# Patient Record
Sex: MALE | Race: WHITE | NOT HISPANIC OR LATINO | Employment: OTHER | URBAN - METROPOLITAN AREA
[De-identification: names, ages, dates, MRNs, and addresses within clinical notes are randomized per-mention and may not be internally consistent; named-entity substitution may affect disease eponyms.]

---

## 2021-09-03 ENCOUNTER — HOSPITAL ENCOUNTER (INPATIENT)
Facility: HOSPITAL | Age: 76
LOS: 7 days | Discharge: HOME | DRG: 872 | End: 2021-09-10
Attending: EMERGENCY MEDICINE | Admitting: INTERNAL MEDICINE
Payer: MEDICARE

## 2021-09-03 ENCOUNTER — APPOINTMENT (EMERGENCY)
Dept: RADIOLOGY | Facility: HOSPITAL | Age: 76
DRG: 872 | End: 2021-09-03
Payer: MEDICARE

## 2021-09-03 ENCOUNTER — APPOINTMENT (EMERGENCY)
Dept: CT IMAGING | Facility: HOSPITAL | Age: 76
DRG: 872 | End: 2021-09-03
Payer: MEDICARE

## 2021-09-03 DIAGNOSIS — R78.81 BACTEREMIA: ICD-10-CM

## 2021-09-03 DIAGNOSIS — R78.81 GRAM-NEGATIVE BACTEREMIA: ICD-10-CM

## 2021-09-03 DIAGNOSIS — R79.89 ELEVATED LFTS: ICD-10-CM

## 2021-09-03 DIAGNOSIS — R50.9 FEVER: Primary | ICD-10-CM

## 2021-09-03 DIAGNOSIS — Z01.818 PREOPERATIVE CLEARANCE: ICD-10-CM

## 2021-09-03 DIAGNOSIS — K80.70 CHOLELITHIASIS WITH CHOLEDOCHOLITHIASIS: ICD-10-CM

## 2021-09-03 PROBLEM — N18.30 CKD (CHRONIC KIDNEY DISEASE) STAGE 3, GFR 30-59 ML/MIN (HCC): Status: ACTIVE | Noted: 2021-09-03

## 2021-09-03 PROBLEM — D69.1 THROMBOCYTOPATHIA (HCC): Status: ACTIVE | Noted: 2021-09-03

## 2021-09-03 PROBLEM — E11.69 HYPERLIPIDEMIA ASSOCIATED WITH TYPE 2 DIABETES MELLITUS (HCC): Status: ACTIVE | Noted: 2021-09-03

## 2021-09-03 PROBLEM — A41.9 SEPSIS (HCC): Status: ACTIVE | Noted: 2021-09-03

## 2021-09-03 PROBLEM — N20.0 HORSESHOE KIDNEY WITH RENAL CALCULUS: Status: ACTIVE | Noted: 2018-10-09

## 2021-09-03 PROBLEM — E83.42 HYPOMAGNESEMIA: Status: ACTIVE | Noted: 2021-08-01

## 2021-09-03 PROBLEM — E78.5 HYPERLIPIDEMIA ASSOCIATED WITH TYPE 2 DIABETES MELLITUS (HCC): Status: ACTIVE | Noted: 2021-09-03

## 2021-09-03 PROBLEM — R77.8 ELEVATED TROPONIN: Status: ACTIVE | Noted: 2021-09-03

## 2021-09-03 PROBLEM — E11.9 DM (DIABETES MELLITUS), TYPE 2 (HCC): Status: ACTIVE | Noted: 2021-09-03

## 2021-09-03 PROBLEM — R74.01 TRANSAMINITIS: Status: ACTIVE | Noted: 2021-09-03

## 2021-09-03 PROBLEM — E03.9 HYPOTHYROIDISM: Status: ACTIVE | Noted: 2021-09-03

## 2021-09-03 PROBLEM — N40.0 BENIGN PROSTATIC HYPERPLASIA WITHOUT LOWER URINARY TRACT SYMPTOMS: Status: ACTIVE | Noted: 2021-09-03

## 2021-09-03 PROBLEM — Q63.1 HORSESHOE KIDNEY WITH RENAL CALCULUS: Status: ACTIVE | Noted: 2018-10-09

## 2021-09-03 LAB
ALBUMIN SERPL BCP-MCNC: 3.3 G/DL (ref 3.5–5)
ALP SERPL-CCNC: 99 U/L (ref 46–116)
ALT SERPL W P-5'-P-CCNC: 485 U/L (ref 12–78)
ANION GAP SERPL CALCULATED.3IONS-SCNC: 11 MMOL/L (ref 4–13)
APAP SERPL-MCNC: <2 UG/ML (ref 10–20)
AST SERPL W P-5'-P-CCNC: 570 U/L (ref 5–45)
ATRIAL RATE: 83 BPM
BASOPHILS # BLD AUTO: 0.04 THOUSANDS/ΜL (ref 0–0.1)
BASOPHILS NFR BLD AUTO: 1 % (ref 0–1)
BILIRUB SERPL-MCNC: 0.97 MG/DL (ref 0.2–1)
BUN SERPL-MCNC: 19 MG/DL (ref 5–25)
CALCIUM ALBUM COR SERPL-MCNC: 9.2 MG/DL (ref 8.3–10.1)
CALCIUM SERPL-MCNC: 8.6 MG/DL (ref 8.3–10.1)
CHLORIDE SERPL-SCNC: 103 MMOL/L (ref 100–108)
CO2 SERPL-SCNC: 24 MMOL/L (ref 21–32)
CREAT SERPL-MCNC: 1.22 MG/DL (ref 0.6–1.3)
EOSINOPHIL # BLD AUTO: 0.13 THOUSAND/ΜL (ref 0–0.61)
EOSINOPHIL NFR BLD AUTO: 2 % (ref 0–6)
ERYTHROCYTE [DISTWIDTH] IN BLOOD BY AUTOMATED COUNT: 13.9 % (ref 11.6–15.1)
FLUAV RNA RESP QL NAA+PROBE: NEGATIVE
FLUBV RNA RESP QL NAA+PROBE: NEGATIVE
GFR SERPL CREATININE-BSD FRML MDRD: 58 ML/MIN/1.73SQ M
GLUCOSE SERPL-MCNC: 140 MG/DL (ref 65–140)
GLUCOSE SERPL-MCNC: 184 MG/DL (ref 65–140)
HBV CORE AB SER QL: NORMAL
HBV CORE IGM SER QL: NORMAL
HBV SURFACE AG SER QL: NORMAL
HCT VFR BLD AUTO: 41.6 % (ref 36.5–49.3)
HCV AB SER QL: NORMAL
HGB BLD-MCNC: 14.1 G/DL (ref 12–17)
IMM GRANULOCYTES # BLD AUTO: 0.04 THOUSAND/UL (ref 0–0.2)
IMM GRANULOCYTES NFR BLD AUTO: 1 % (ref 0–2)
INR PPP: 1.18 (ref 0.84–1.19)
LACTATE SERPL-SCNC: 1.9 MMOL/L (ref 0.5–2)
LACTATE SERPL-SCNC: 2.2 MMOL/L (ref 0.5–2)
LACTATE SERPL-SCNC: 2.3 MMOL/L (ref 0.5–2)
LYMPHOCYTES # BLD AUTO: 0.29 THOUSANDS/ΜL (ref 0.6–4.47)
LYMPHOCYTES NFR BLD AUTO: 5 % (ref 14–44)
MAGNESIUM SERPL-MCNC: 1 MG/DL (ref 1.6–2.6)
MCH RBC QN AUTO: 28.5 PG (ref 26.8–34.3)
MCHC RBC AUTO-ENTMCNC: 33.9 G/DL (ref 31.4–37.4)
MCV RBC AUTO: 84 FL (ref 82–98)
MONOCYTES # BLD AUTO: 0.35 THOUSAND/ΜL (ref 0.17–1.22)
MONOCYTES NFR BLD AUTO: 6 % (ref 4–12)
NEUTROPHILS # BLD AUTO: 5 THOUSANDS/ΜL (ref 1.85–7.62)
NEUTS SEG NFR BLD AUTO: 85 % (ref 43–75)
NRBC BLD AUTO-RTO: 0 /100 WBCS
P AXIS: 28 DEGREES
PLATELET # BLD AUTO: 88 THOUSANDS/UL (ref 149–390)
PMV BLD AUTO: 9.6 FL (ref 8.9–12.7)
POTASSIUM SERPL-SCNC: 4.5 MMOL/L (ref 3.5–5.3)
PR INTERVAL: 272 MS
PROCALCITONIN SERPL-MCNC: 1.39 NG/ML
PROT SERPL-MCNC: 6.3 G/DL (ref 6.4–8.2)
PROTHROMBIN TIME: 14.5 SECONDS (ref 11.6–14.5)
QRS AXIS: 1 DEGREES
QRSD INTERVAL: 92 MS
QT INTERVAL: 340 MS
QTC INTERVAL: 399 MS
RBC # BLD AUTO: 4.94 MILLION/UL (ref 3.88–5.62)
RSV RNA RESP QL NAA+PROBE: NEGATIVE
SARS-COV-2 RNA RESP QL NAA+PROBE: NEGATIVE
SODIUM SERPL-SCNC: 138 MMOL/L (ref 136–145)
T WAVE AXIS: 11 DEGREES
TROPONIN I SERPL-MCNC: 0.09 NG/ML
VENTRICULAR RATE: 83 BPM
WBC # BLD AUTO: 5.85 THOUSAND/UL (ref 4.31–10.16)

## 2021-09-03 PROCEDURE — 0241U HB NFCT DS VIR RESP RNA 4 TRGT: CPT | Performed by: EMERGENCY MEDICINE

## 2021-09-03 PROCEDURE — 84145 PROCALCITONIN (PCT): CPT | Performed by: INTERNAL MEDICINE

## 2021-09-03 PROCEDURE — 80143 DRUG ASSAY ACETAMINOPHEN: CPT | Performed by: INTERNAL MEDICINE

## 2021-09-03 PROCEDURE — 1124F ACP DISCUSS-NO DSCNMKR DOCD: CPT | Performed by: EMERGENCY MEDICINE

## 2021-09-03 PROCEDURE — 87077 CULTURE AEROBIC IDENTIFY: CPT | Performed by: EMERGENCY MEDICINE

## 2021-09-03 PROCEDURE — 93005 ELECTROCARDIOGRAM TRACING: CPT

## 2021-09-03 PROCEDURE — 36415 COLL VENOUS BLD VENIPUNCTURE: CPT | Performed by: EMERGENCY MEDICINE

## 2021-09-03 PROCEDURE — 87186 SC STD MICRODIL/AGAR DIL: CPT | Performed by: EMERGENCY MEDICINE

## 2021-09-03 PROCEDURE — 83605 ASSAY OF LACTIC ACID: CPT | Performed by: INTERNAL MEDICINE

## 2021-09-03 PROCEDURE — 96365 THER/PROPH/DIAG IV INF INIT: CPT

## 2021-09-03 PROCEDURE — 83605 ASSAY OF LACTIC ACID: CPT | Performed by: EMERGENCY MEDICINE

## 2021-09-03 PROCEDURE — 84484 ASSAY OF TROPONIN QUANT: CPT | Performed by: EMERGENCY MEDICINE

## 2021-09-03 PROCEDURE — 96368 THER/DIAG CONCURRENT INF: CPT

## 2021-09-03 PROCEDURE — 71045 X-RAY EXAM CHEST 1 VIEW: CPT

## 2021-09-03 PROCEDURE — 86705 HEP B CORE ANTIBODY IGM: CPT | Performed by: INTERNAL MEDICINE

## 2021-09-03 PROCEDURE — 85025 COMPLETE CBC W/AUTO DIFF WBC: CPT | Performed by: EMERGENCY MEDICINE

## 2021-09-03 PROCEDURE — 80053 COMPREHEN METABOLIC PANEL: CPT | Performed by: EMERGENCY MEDICINE

## 2021-09-03 PROCEDURE — 84484 ASSAY OF TROPONIN QUANT: CPT | Performed by: INTERNAL MEDICINE

## 2021-09-03 PROCEDURE — 99285 EMERGENCY DEPT VISIT HI MDM: CPT | Performed by: EMERGENCY MEDICINE

## 2021-09-03 PROCEDURE — G1004 CDSM NDSC: HCPCS

## 2021-09-03 PROCEDURE — 85610 PROTHROMBIN TIME: CPT | Performed by: INTERNAL MEDICINE

## 2021-09-03 PROCEDURE — 87040 BLOOD CULTURE FOR BACTERIA: CPT | Performed by: EMERGENCY MEDICINE

## 2021-09-03 PROCEDURE — 87340 HEPATITIS B SURFACE AG IA: CPT | Performed by: INTERNAL MEDICINE

## 2021-09-03 PROCEDURE — 82948 REAGENT STRIP/BLOOD GLUCOSE: CPT

## 2021-09-03 PROCEDURE — 74177 CT ABD & PELVIS W/CONTRAST: CPT

## 2021-09-03 PROCEDURE — 96361 HYDRATE IV INFUSION ADD-ON: CPT

## 2021-09-03 PROCEDURE — 99285 EMERGENCY DEPT VISIT HI MDM: CPT

## 2021-09-03 PROCEDURE — 83735 ASSAY OF MAGNESIUM: CPT | Performed by: EMERGENCY MEDICINE

## 2021-09-03 PROCEDURE — 99222 1ST HOSP IP/OBS MODERATE 55: CPT | Performed by: INTERNAL MEDICINE

## 2021-09-03 PROCEDURE — 93010 ELECTROCARDIOGRAM REPORT: CPT | Performed by: INTERNAL MEDICINE

## 2021-09-03 PROCEDURE — 86803 HEPATITIS C AB TEST: CPT | Performed by: INTERNAL MEDICINE

## 2021-09-03 PROCEDURE — 71260 CT THORAX DX C+: CPT

## 2021-09-03 PROCEDURE — 86704 HEP B CORE ANTIBODY TOTAL: CPT | Performed by: INTERNAL MEDICINE

## 2021-09-03 RX ORDER — LORATADINE 10 MG/1
10 TABLET ORAL DAILY
COMMUNITY

## 2021-09-03 RX ORDER — ACETAMINOPHEN 325 MG/1
650 TABLET ORAL EVERY 6 HOURS PRN
Status: DISCONTINUED | OUTPATIENT
Start: 2021-09-03 | End: 2021-09-10 | Stop reason: HOSPADM

## 2021-09-03 RX ORDER — LEVOTHYROXINE SODIUM 0.05 MG/1
50 TABLET ORAL DAILY
COMMUNITY

## 2021-09-03 RX ORDER — PROPRANOLOL HYDROCHLORIDE 20 MG/1
60 TABLET ORAL DAILY
Status: DISCONTINUED | OUTPATIENT
Start: 2021-09-03 | End: 2021-09-10 | Stop reason: HOSPADM

## 2021-09-03 RX ORDER — INSULIN ASPART 100 [IU]/ML
22 INJECTION, SUSPENSION SUBCUTANEOUS DAILY
COMMUNITY

## 2021-09-03 RX ORDER — LORATADINE 10 MG/1
10 TABLET ORAL DAILY
Status: DISCONTINUED | OUTPATIENT
Start: 2021-09-03 | End: 2021-09-10 | Stop reason: HOSPADM

## 2021-09-03 RX ORDER — SODIUM CHLORIDE, SODIUM GLUCONATE, SODIUM ACETATE, POTASSIUM CHLORIDE, MAGNESIUM CHLORIDE, SODIUM PHOSPHATE, DIBASIC, AND POTASSIUM PHOSPHATE .53; .5; .37; .037; .03; .012; .00082 G/100ML; G/100ML; G/100ML; G/100ML; G/100ML; G/100ML; G/100ML
100 INJECTION, SOLUTION INTRAVENOUS CONTINUOUS
Status: DISCONTINUED | OUTPATIENT
Start: 2021-09-03 | End: 2021-09-03

## 2021-09-03 RX ORDER — PROPRANOLOL HYDROCHLORIDE 60 MG/1
60 TABLET ORAL DAILY
COMMUNITY

## 2021-09-03 RX ORDER — SILDENAFIL 100 MG/1
100 TABLET, FILM COATED ORAL DAILY PRN
COMMUNITY

## 2021-09-03 RX ORDER — OXYCODONE HYDROCHLORIDE 5 MG/1
5 TABLET ORAL EVERY 4 HOURS PRN
Status: DISCONTINUED | OUTPATIENT
Start: 2021-09-03 | End: 2021-09-10 | Stop reason: HOSPADM

## 2021-09-03 RX ORDER — INSULIN GLARGINE 100 [IU]/ML
28 INJECTION, SOLUTION SUBCUTANEOUS
COMMUNITY

## 2021-09-03 RX ORDER — MAGNESIUM SULFATE HEPTAHYDRATE 40 MG/ML
2 INJECTION, SOLUTION INTRAVENOUS ONCE
Status: COMPLETED | OUTPATIENT
Start: 2021-09-03 | End: 2021-09-03

## 2021-09-03 RX ORDER — PRAMIPEXOLE DIHYDROCHLORIDE 0.5 MG/1
1.5 TABLET ORAL 2 TIMES DAILY
Status: DISCONTINUED | OUTPATIENT
Start: 2021-09-03 | End: 2021-09-10 | Stop reason: HOSPADM

## 2021-09-03 RX ORDER — GABAPENTIN 400 MG/1
800 CAPSULE ORAL 3 TIMES DAILY
Status: DISCONTINUED | OUTPATIENT
Start: 2021-09-03 | End: 2021-09-10 | Stop reason: HOSPADM

## 2021-09-03 RX ORDER — ALOGLIPTIN 25 MG/1
25 TABLET, FILM COATED ORAL
COMMUNITY

## 2021-09-03 RX ORDER — LEVOFLOXACIN 5 MG/ML
750 INJECTION, SOLUTION INTRAVENOUS ONCE
Status: COMPLETED | OUTPATIENT
Start: 2021-09-03 | End: 2021-09-03

## 2021-09-03 RX ORDER — MELATONIN
1000 2 TIMES DAILY
COMMUNITY

## 2021-09-03 RX ORDER — PRAMIPEXOLE DIHYDROCHLORIDE 1.5 MG/1
1.5 TABLET ORAL 2 TIMES DAILY
COMMUNITY

## 2021-09-03 RX ORDER — INSULIN ASPART 100 [IU]/ML
22 INJECTION, SUSPENSION SUBCUTANEOUS DAILY
Status: DISCONTINUED | OUTPATIENT
Start: 2021-09-04 | End: 2021-09-10 | Stop reason: HOSPADM

## 2021-09-03 RX ORDER — LEVOTHYROXINE SODIUM 0.05 MG/1
50 TABLET ORAL DAILY
Status: DISCONTINUED | OUTPATIENT
Start: 2021-09-03 | End: 2021-09-10 | Stop reason: HOSPADM

## 2021-09-03 RX ORDER — LANOLIN ALCOHOL/MO/W.PET/CERES
CREAM (GRAM) TOPICAL DAILY
COMMUNITY

## 2021-09-03 RX ORDER — MELOXICAM 7.5 MG/1
7.5 TABLET ORAL AS NEEDED
COMMUNITY

## 2021-09-03 RX ORDER — GABAPENTIN 800 MG/1
900 TABLET ORAL 3 TIMES DAILY
COMMUNITY

## 2021-09-03 RX ORDER — PRAVASTATIN SODIUM 40 MG
40 TABLET ORAL DAILY
COMMUNITY

## 2021-09-03 RX ORDER — INSULIN ASPART 100 [IU]/ML
22 INJECTION, SUSPENSION SUBCUTANEOUS DAILY
Status: DISCONTINUED | OUTPATIENT
Start: 2021-09-03 | End: 2021-09-03

## 2021-09-03 RX ORDER — HEPARIN SODIUM 5000 [USP'U]/ML
5000 INJECTION, SOLUTION INTRAVENOUS; SUBCUTANEOUS EVERY 8 HOURS SCHEDULED
Status: DISCONTINUED | OUTPATIENT
Start: 2021-09-03 | End: 2021-09-08

## 2021-09-03 RX ORDER — TAMSULOSIN HYDROCHLORIDE 0.4 MG/1
0.4 CAPSULE ORAL
Status: DISCONTINUED | OUTPATIENT
Start: 2021-09-03 | End: 2021-09-10 | Stop reason: HOSPADM

## 2021-09-03 RX ORDER — INSULIN GLARGINE 100 [IU]/ML
28 INJECTION, SOLUTION SUBCUTANEOUS
Status: DISCONTINUED | OUTPATIENT
Start: 2021-09-03 | End: 2021-09-10 | Stop reason: HOSPADM

## 2021-09-03 RX ORDER — ASPIRIN 81 MG/1
81 TABLET, CHEWABLE ORAL DAILY
COMMUNITY

## 2021-09-03 RX ORDER — PRAVASTATIN SODIUM 40 MG
40 TABLET ORAL DAILY
Status: CANCELLED | OUTPATIENT
Start: 2021-09-03

## 2021-09-03 RX ORDER — TAMSULOSIN HYDROCHLORIDE 0.4 MG/1
0.4 CAPSULE ORAL 2 TIMES DAILY
COMMUNITY

## 2021-09-03 RX ORDER — HYDROMORPHONE HCL/PF 1 MG/ML
0.5 SYRINGE (ML) INJECTION ONCE
Status: COMPLETED | OUTPATIENT
Start: 2021-09-03 | End: 2021-09-04

## 2021-09-03 RX ORDER — ASPIRIN 81 MG/1
81 TABLET, CHEWABLE ORAL DAILY
Status: DISCONTINUED | OUTPATIENT
Start: 2021-09-03 | End: 2021-09-10 | Stop reason: HOSPADM

## 2021-09-03 RX ORDER — TIZANIDINE HYDROCHLORIDE 4 MG/1
4 CAPSULE, GELATIN COATED ORAL AS NEEDED
COMMUNITY

## 2021-09-03 RX ORDER — IBUPROFEN 200 MG
200 TABLET ORAL ONCE AS NEEDED
Status: DISCONTINUED | OUTPATIENT
Start: 2021-09-03 | End: 2021-09-03

## 2021-09-03 RX ORDER — TRAMADOL HYDROCHLORIDE 50 MG/1
50 TABLET ORAL EVERY 6 HOURS PRN
COMMUNITY

## 2021-09-03 RX ORDER — MAGNESIUM SULFATE HEPTAHYDRATE 40 MG/ML
2 INJECTION, SOLUTION INTRAVENOUS ONCE
Status: COMPLETED | OUTPATIENT
Start: 2021-09-03 | End: 2021-09-04

## 2021-09-03 RX ADMIN — PROPRANOLOL HYDROCHLORIDE 60 MG: 20 TABLET ORAL at 15:36

## 2021-09-03 RX ADMIN — SODIUM CHLORIDE 1000 ML: 0.9 INJECTION, SOLUTION INTRAVENOUS at 15:33

## 2021-09-03 RX ADMIN — SODIUM CHLORIDE 1000 ML: 0.9 INJECTION, SOLUTION INTRAVENOUS at 18:01

## 2021-09-03 RX ADMIN — SODIUM CHLORIDE 1000 ML: 0.9 INJECTION, SOLUTION INTRAVENOUS at 11:50

## 2021-09-03 RX ADMIN — ACETAMINOPHEN 650 MG: 325 TABLET, FILM COATED ORAL at 21:26

## 2021-09-03 RX ADMIN — GABAPENTIN 800 MG: 400 CAPSULE ORAL at 15:36

## 2021-09-03 RX ADMIN — IOHEXOL 100 ML: 350 INJECTION, SOLUTION INTRAVENOUS at 13:05

## 2021-09-03 RX ADMIN — HEPARIN SODIUM 5000 UNITS: 5000 INJECTION INTRAVENOUS; SUBCUTANEOUS at 16:32

## 2021-09-03 RX ADMIN — METRONIDAZOLE 500 MG: 500 INJECTION, SOLUTION INTRAVENOUS at 22:57

## 2021-09-03 RX ADMIN — INSULIN LISPRO 12 UNITS: 100 INJECTION, SOLUTION INTRAVENOUS; SUBCUTANEOUS at 18:10

## 2021-09-03 RX ADMIN — VANCOMYCIN HYDROCHLORIDE 1500 MG: 1 INJECTION, POWDER, LYOPHILIZED, FOR SOLUTION INTRAVENOUS at 17:59

## 2021-09-03 RX ADMIN — MAGNESIUM SULFATE HEPTAHYDRATE 2 G: 40 INJECTION, SOLUTION INTRAVENOUS at 13:44

## 2021-09-03 RX ADMIN — LEVOFLOXACIN 750 MG: 5 INJECTION, SOLUTION INTRAVENOUS at 13:39

## 2021-09-03 RX ADMIN — INSULIN LISPRO 2 UNITS: 100 INJECTION, SOLUTION INTRAVENOUS; SUBCUTANEOUS at 18:09

## 2021-09-03 RX ADMIN — OXYCODONE HYDROCHLORIDE 5 MG: 5 TABLET ORAL at 16:32

## 2021-09-03 RX ADMIN — HEPARIN SODIUM 5000 UNITS: 5000 INJECTION INTRAVENOUS; SUBCUTANEOUS at 22:56

## 2021-09-03 RX ADMIN — TAMSULOSIN HYDROCHLORIDE 0.4 MG: 0.4 CAPSULE ORAL at 15:36

## 2021-09-03 RX ADMIN — ASPIRIN 81 MG CHEWABLE TABLET 81 MG: 81 TABLET CHEWABLE at 15:36

## 2021-09-03 RX ADMIN — OXYCODONE HYDROCHLORIDE 5 MG: 5 TABLET ORAL at 21:26

## 2021-09-03 RX ADMIN — INSULIN GLARGINE 28 UNITS: 100 INJECTION, SOLUTION SUBCUTANEOUS at 22:56

## 2021-09-03 RX ADMIN — LEVOTHYROXINE SODIUM 50 MCG: 50 TABLET ORAL at 15:36

## 2021-09-03 RX ADMIN — PRAMIPEXOLE DIHYDROCHLORIDE 1.5 MG: 0.5 TABLET ORAL at 17:55

## 2021-09-03 RX ADMIN — LORATADINE 10 MG: 10 TABLET ORAL at 15:36

## 2021-09-03 RX ADMIN — GABAPENTIN 800 MG: 400 CAPSULE ORAL at 21:26

## 2021-09-03 NOTE — ASSESSMENT & PLAN NOTE
· AST//485  · Etiology may be secondary to sepsis; will continue monitor  · Will check chronic hepatitis panel, INR, and liver Doppler with ultrasound  · Recheck liver enzymes tomorrow

## 2021-09-03 NOTE — ED PROVIDER NOTES
History  Chief Complaint   Patient presents with    Fever - 9 weeks to 74 years     Pt has been febrile since lastnight and cough  Was given tylenol at 9am         History provided by:  Patient  Fever - 9 weeks to 74 years  Max temp prior to arrival:  105  Temp source:  Oral  Severity:  Moderate  Onset quality:  Sudden  Duration:  1 day  Timing:  Constant  Progression:  Partially resolved  Chronicity:  New  Relieved by:  Acetaminophen  Worsened by:  Nothing  Ineffective treatments:  None tried  Associated symptoms: chills, cough, diarrhea, myalgias and nausea    Risk factors: recent sickness (Was hospitalized in early Aug for 1 week for a LLL PNA) and recent travel (Up visiting from [facility])    Risk factors: no hx of cancer, no immunosuppression and no sick contacts        Prior to Admission Medications   Prescriptions Last Dose Informant Patient Reported? Taking?    Alogliptin Benzoate 25 MG TABS   Yes Yes   Sig: Take 25 mg by mouth   TiZANidine (ZANAFLEX) 4 MG capsule   Yes Yes   Sig: Take 4 mg by mouth as needed for muscle spasms   aspirin 81 mg chewable tablet   Yes Yes   Sig: Chew 81 mg daily   cholecalciferol (VITAMIN D3) 1,000 units tablet   Yes Yes   Sig: Take 1,000 Units by mouth 2 (two) times a day   gabapentin (NEURONTIN) 800 mg tablet   Yes Yes   Sig: Take 900 mg by mouth 3 (three) times a day   insulin aspart (NovoLOG) 100 units/mL injection   Yes Yes   Sig: Inject 12 Units under the skin daily before dinner   insulin aspart protamine-insulin aspart (NovoLOG 70/30) 100 units/mL injection   Yes Yes   Sig: Inject 22 Units under the skin daily   insulin glargine (LANTUS) 100 units/mL subcutaneous injection   Yes Yes   Sig: Inject 28 Units under the skin daily at bedtime   levothyroxine 50 mcg tablet   Yes Yes   Sig: Take 50 mcg by mouth daily   loratadine (CLARITIN) 10 mg tablet   Yes Yes   Sig: Take 10 mg by mouth daily   meloxicam (MOBIC) 7 5 mg tablet   Yes Yes   Sig: Take 7 5 mg by mouth as needed metFORMIN (GLUCOPHAGE) 500 mg tablet   Yes Yes   Sig: Take 500 mg by mouth 2 (two) times a day with meals   pramipexole (MIRAPEX) 1 5 MG tablet   Yes Yes   Sig: Take 1 5 mg by mouth 2 (two) times a day   pravastatin (PRAVACHOL) 40 mg tablet   Yes Yes   Sig: Take 40 mg by mouth daily   propranolol (INDERAL) 60 mg tablet   Yes Yes   Sig: Take 60 mg by mouth daily   sildenafil (VIAGRA) 100 mg tablet   Yes Yes   Sig: Take 100 mg by mouth daily as needed for erectile dysfunction   tamsulosin (FLOMAX) 0 4 mg   Yes Yes   Sig: Take 0 4 mg by mouth 2 (two) times a day   traMADol (ULTRAM) 50 mg tablet   Yes Yes   Sig: Take 50 mg by mouth every 6 (six) hours as needed for moderate pain   vitamin B-12 (VITAMIN B-12) 1,000 mcg tablet   Yes Yes   Sig: Take by mouth daily      Facility-Administered Medications: None       Past Medical History:   Diagnosis Date    Diabetes mellitus ([facility] )     MI (myocardial infarction) ([facility] )        Past Surgical History:   Procedure Laterality Date    BACK SURGERY      CARDIAC SURGERY      JOINT REPLACEMENT         History reviewed  No pertinent family history  I have reviewed and agree with the history as documented  E-Cigarette/Vaping    E-Cigarette Use Never User      E-Cigarette/Vaping Substances     Social History     Tobacco Use    Smoking status: Never Smoker    Smokeless tobacco: Current User   Vaping Use    Vaping Use: Never used   Substance Use Topics    Alcohol use: Never    Drug use: Never       Review of Systems   Constitutional: Positive for chills and fever  Respiratory: Positive for cough  Gastrointestinal: Positive for diarrhea and nausea  Musculoskeletal: Positive for myalgias  All other systems reviewed and are negative  Physical Exam  Physical Exam  Vitals reviewed  Constitutional:       Appearance: He is ill-appearing  He is not toxic-appearing  HENT:      Head: Normocephalic and atraumatic        Mouth/Throat:      Mouth: Mucous membranes are dry    Eyes:      Extraocular Movements: Extraocular movements intact  Pupils: Pupils are equal, round, and reactive to light  Cardiovascular:      Rate and Rhythm: Normal rate  Pulmonary:      Effort: Pulmonary effort is normal  No respiratory distress  Abdominal:      Palpations: Abdomen is soft  Tenderness: There is abdominal tenderness  Musculoskeletal:         General: No swelling  Cervical back: Neck supple  Skin:     General: Skin is warm  Findings: No bruising  Neurological:      General: No focal deficit present  Mental Status: He is alert  Psychiatric:         Mood and Affect: Mood normal          Vital Signs  ED Triage Vitals [09/03/21 1146]   Temperature Pulse Respirations Blood Pressure SpO2   98 °F (36 7 °C) 84 18 108/57 98 %      Temp Source Heart Rate Source Patient Position - Orthostatic VS BP Location FiO2 (%)   Oral Monitor -- -- --      Pain Score       --           Vitals:    09/03/21 1146 09/03/21 1200 09/03/21 1315   BP: 108/57 124/58 150/73   Pulse: 84 79 84         Visual Acuity      ED Medications  Medications   magnesium sulfate 2 g/50 mL IVPB (premix) 2 g (2 g Intravenous New Bag 9/3/21 1344)   levofloxacin (LEVAQUIN) IVPB (premix in dextrose) 750 mg 150 mL (750 mg Intravenous New Bag 9/3/21 1339)   sodium chloride 0 9 % bolus 1,000 mL (has no administration in time range)   sodium chloride 0 9 % bolus 1,000 mL (0 mL Intravenous Stopped 9/3/21 1321)   iohexol (OMNIPAQUE) 350 MG/ML injection (SINGLE-DOSE) 100 mL (100 mL Intravenous Given 9/3/21 1305)       Diagnostic Studies  Results Reviewed     Procedure Component Value Units Date/Time    Blood culture #1 [[id number]] Collected: 09/03/21 1338    Lab Status: In process Specimen: Blood from Arm, Right Updated: 09/03/21 1343    Lactic acid [[id number]] Collected: 09/03/21 1338    Lab Status:  In process Specimen: Blood from Arm, Right Updated: 09/03/21 1343    Troponin I repeat in 3hrs [[id number]]     Lab Status: No result Specimen: Blood     Lactic acid [[id number]]  (Abnormal) Collected: 09/03/21 1149    Lab Status: Final result Specimen: Blood from Arm, Left Updated: 09/03/21 1246     LACTIC ACID 2 2 mmol/L     Narrative:      Result may be elevated if tourniquet was used during collection  COVID19, Influenza A/B, RSV PCR, [facility] [[id number]]  (Normal) Collected: 09/03/21 1149    Lab Status: Final result Specimen: Nares from Nasopharyngeal Swab Updated: 09/03/21 1239     SARS-CoV-2 Negative     INFLUENZA A PCR Negative     INFLUENZA B PCR Negative     RSV PCR Negative    Narrative: This test has been authorized by FDA under an EUA (Emergency Use Assay) for use by authorized laboratories  Clinical caution and judgement should be used with the interpretation of these results with consideration of the clinical impression and other laboratory testing  Testing reported as "Positive" or "Negative" has been proven to be accurate according to standard laboratory validation requirements  All testing is performed with control materials showing appropriate reactivity at standard intervals      Comprehensive metabolic panel [[id number]]  (Abnormal) Collected: 09/03/21 1149    Lab Status: Final result Specimen: Blood from Arm, Left Updated: 09/03/21 1230     Sodium 138 mmol/L      Potassium 4 5 mmol/L      Chloride 103 mmol/L      CO2 24 mmol/L      ANION GAP 11 mmol/L      BUN 19 mg/dL      Creatinine 1 22 mg/dL      Glucose 184 mg/dL      Calcium 8 6 mg/dL      Corrected Calcium 9 2 mg/dL       U/L       U/L      Alkaline Phosphatase 99 U/L      Total Protein 6 3 g/dL      Albumin 3 3 g/dL      Total Bilirubin 0 97 mg/dL      eGFR 58 ml/min/1 73sq m     Narrative:      [place] guidelines for Chronic Kidney Disease (CKD):     Stage 1 with normal or high GFR (GFR > 90 mL/min/1 73 square meters)    Stage 2 Mild CKD (GFR = 60-89 mL/min/1 73 square meters)    Stage 3A Moderate CKD (GFR = 45-59 mL/min/1 73 square meters)    Stage 3B Moderate CKD (GFR = 30-44 mL/min/1 73 square meters)    Stage 4 Severe CKD (GFR = 15-29 mL/min/1 73 square meters)    Stage 5 End Stage CKD (GFR <15 mL/min/1 73 square meters)  Note: GFR calculation is accurate only with a steady state creatinine    Magnesium [[id number]]  (Abnormal) Collected: 09/03/21 1149    Lab Status: Final result Specimen: Blood from Arm, Left Updated: 09/03/21 1230     Magnesium 1 0 mg/dL     CBC and differential [[id number]]  (Abnormal) Collected: 09/03/21 1149    Lab Status: Final result Specimen: Blood from Arm, Left Updated: 09/03/21 1230     WBC 5 85 Thousand/uL      RBC 4 94 Million/uL      Hemoglobin 14 1 g/dL      Hematocrit 41 6 %      MCV 84 fL      MCH 28 5 pg      MCHC 33 9 g/dL      RDW 13 9 %      MPV 9 6 fL      Platelets 88 Thousands/uL      nRBC 0 /100 WBCs      Neutrophils Relative 85 %      Immat GRANS % 1 %      Lymphocytes Relative 5 %      Monocytes Relative 6 %      Eosinophils Relative 2 %      Basophils Relative 1 %      Neutrophils Absolute 5 00 Thousands/µL      Immature Grans Absolute 0 04 Thousand/uL      Lymphocytes Absolute 0 29 Thousands/µL      Monocytes Absolute 0 35 Thousand/µL      Eosinophils Absolute 0 13 Thousand/µL      Basophils Absolute 0 04 Thousands/µL     Troponin I [[id number]]  (Abnormal) Collected: 09/03/21 1149    Lab Status: Final result Specimen: Blood from Arm, Left Updated: 09/03/21 1218     Troponin I 0 09 ng/mL     Blood culture #2 [[id number]] Collected: 09/03/21 1149    Lab Status: In process Specimen: Blood from Arm, Left Updated: 09/03/21 1155    UA w Reflex to Microscopic w Reflex to Culture [[id number]]     Lab Status: No result Specimen: Urine                  CT chest abdomen pelvis w contrast   Final Result by Mindy Boyd MD (09/03 5907)      1  Mild bronchial wall thickening, which may related to small airwas inflammation    Right basilar subsegmental atelectasis also present  2   No acute abnormality within the abdomen or pelvis  3   Several enhancing foci within the posterior right hepatic lobe, possibly representing hemangiomas  Further characterization with nonemergent liver MRI recommended  4   Horseshoe kidney  The study was marked in [facility] for immediate notification  Workstation performed: [id number]         XR chest 1 view portable   ED Interpretation by Ileana Flores MD (09/03 1202)   NAD                 Procedures  Procedures         ED Course                             SBIRT 22yo+      Most Recent Value   SBIRT (23 yo +)   In order to provide better care to our patients, we are screening all of our patients for alcohol and drug use  Would it be okay to ask you these screening questions? Yes Filed at: 09/03/2021 1220   Initial Alcohol Screen: US AUDIT-C    1  How often do you have a drink containing alcohol?  0 Filed at: 09/03/2021 1220   2  How many drinks containing alcohol do you have on a typical day you are drinking? 0 Filed at: 09/03/2021 1220   3a  Male UNDER 65: How often do you have five or more drinks on one occasion? 0 Filed at: 09/03/2021 1220   3b  FEMALE Any Age, or MALE 65+: How often do you have 4 or more drinks on one occassion? 0 Filed at: 09/03/2021 1220   Audit-C Score  0 Filed at: 09/03/2021 1220   AR: How many times in the past year have you    Used an illegal drug or used a prescription medication for non-medical reasons?   Never Filed at: 09/03/2021 1220                    MDM  Number of Diagnoses or Management Options  Fever: new and requires workup     Amount and/or Complexity of Data Reviewed  Clinical lab tests: ordered and reviewed  Tests in the radiology section of CPT®: ordered and reviewed  Independent visualization of images, tracings, or specimens: yes    Risk of Complications, Morbidity, and/or Mortality  Presenting problems: high    Patient Progress  Patient progress: stable      Disposition  Final diagnoses:   Fever     Time reflects when diagnosis was documented in both MDM as applicable and the Disposition within this note     Time User Action Codes Description Comment    9/3/2021  2:05 PM Guero VAN Add [R50 9] Fever       ED Disposition     ED Disposition Condition Date/Time Comment    Admit Stable Fri Sep 3, 2021  2:05 PM       Follow-up Information    None         Patient's Medications   Discharge Prescriptions    No medications on file     No discharge procedures on file      PDMP Review     None          ED Provider  Electronically Signed by           Erin Moulton MD  09/03/21 1369

## 2021-09-03 NOTE — ASSESSMENT & PLAN NOTE
Lab Results   Component Value Date    EGFR 58 09/03/2021    CREATININE 1 22 09/03/2021   · Patient with history of horseshoe kidney  · Creatinine at baseline currently; baseline appears to be 1-1 2  · Continue to monitor

## 2021-09-03 NOTE — ASSESSMENT & PLAN NOTE
· Patient states he woke up with fever and rigors this morning and did state he had an episode of diarrhea today    Patient denies eating anything out of the ordinary yesterday and denies eating any shellfish or undercooked meats  · Patient is vaccinated and no on else in the family is currently sick  · Sepsis with unknown source; patient with temp of 101 7°, respirations 22, platelets 88, and lactic acid of 2 3  · AST and ALT markedly elevated  · CT chest abdomen pelvis-mild bronchial wall thickening; no acute abnormality within abdomen or pelvis; several enhancing foci within posterior right hepatic lobe possibly representing hemangiomas; horseshoe kidney  · Chest x-ray-no acute cardiopulmonary disease  · Lactic acid 2 3  · WBC count normal    Plan  · Will start aztreonam given cefazolin and penicillin allergy  · Will start vancomycin  · Will replete with sepsis protocol fluids  · Trend lactic acid until less than 2  · Blood cultures x2 drawn  · Follow-up UA  · Procalcitonin ordered  · Will check liver Doppler

## 2021-09-03 NOTE — ASSESSMENT & PLAN NOTE
No results found for: HGBA1C    No results for input(s): POCGLU in the last 72 hours      Blood Sugar Average: Last 72 hrs:  ·  hold oral antihyperglycemics  · Correctional scale insulin  · Glargine 28 units at night  · lispro 12 units with dinner  · NovoLog 70/3022 units in the morning

## 2021-09-03 NOTE — H&P
41 Annamarie  1945, 78 y o  male MRN: [id number]  Unit/Bed#: -01 Encounter: [id number]  Primary Care Provider: Apple Morgan   Date and time admitted to hospital: 9/3/2021 11:36 AM    * Sepsis [facility])  Assessment & Plan  · Patient states he woke up with fever and rigors this morning and did state he had an episode of diarrhea today    Patient denies eating anything out of the ordinary yesterday and denies eating any shellfish or undercooked meats  · Patient is vaccinated and no on else in the family is currently sick  · Sepsis with unknown source; patient with temp of 101 7°, respirations 22, platelets 88, and lactic acid of 2 3  · AST and ALT markedly elevated  · CT chest abdomen pelvis-mild bronchial wall thickening; no acute abnormality within abdomen or pelvis; several enhancing foci within posterior right hepatic lobe possibly representing hemangiomas; horseshoe kidney  · Chest x-ray-no acute cardiopulmonary disease  · Lactic acid 2 3  · WBC count normal    Plan  · Will start aztreonam given cefazolin and penicillin allergy  · Will start vancomycin  · Will replete with sepsis protocol fluids  · Trend lactic acid until less than 2  · Blood cultures x2 drawn  · Follow-up UA  · Procalcitonin ordered  · If patient still spiking times tomorrow may be pertinent to have orthopedics evaluate patient's knees as he did have right knee fusion in 1975 and has had septic knee in the past   Although patient denies any recent injury to either knee and no change in range of motion or pain with motion  · Will check liver Doppler    Transaminitis  Assessment & Plan  · AST//485  · patient was treated for pneumonia with levofloxacin recently  · Etiology may be secondary to sepsis; will continue monitor  · Will check chronic hepatitis panel, and liver Doppler with ultrasound  · Recheck liver enzymes tomorrow    Elevated troponin  Assessment & Plan  · Troponin 0 09; will trend at this time  · May be secondary to fever and underlying infection  · EKG-normal sinus rhythm with AV block noted; no ST or T-wave changes noted    CKD (chronic kidney disease) stage 3, GFR 30-59 ml/min [facility])  Assessment & Plan  Lab Results   Component Value Date    EGFR 58 09/03/2021    CREATININE 1 22 09/03/2021   · Patient with history of horseshoe kidney  · Creatinine at baseline currently; baseline appears to be 1-1 2  · Continue to monitor    Thrombocytopathia [facility])  Assessment & Plan  · Platelets 88; upon chart review patient has had slight downtrend in platelets and baseline appears to be around 100  · May be secondary to sepsis    DM (diabetes mellitus), type 2 ([facility] [address] )  Assessment & Plan  No results found for: HGBA1C    No results for input(s): POCGLU in the last 72 hours  Blood Sugar Average: Last 72 hrs:  ·  hold oral antihyperglycemics  · Correctional scale insulin  · Glargine 28 units at night  · lispro 12 units with dinner  · NovoLog 70/3022 units in the morning      Hypothyroidism  Assessment & Plan  Continue levothyroxine 50 mcg daily    Hypomagnesemia  Assessment & Plan  · Will give IV magnesium  · Recheck tomorrow    Hyperlipidemia associated with type 2 diabetes mellitus ([facility] [address] )  Assessment & Plan  Given markedly elevated AST and ALT will hold statin at this time      Horseshoe kidney with renal calculus  Assessment & Plan  Plan is under CKD    History of endocarditis  Assessment & Plan  · 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  Benign prostatic hyperplasia without lower urinary tract symptoms  Assessment & Plan  Continue tamsulosin    CAD (coronary artery disease)  Assessment & Plan  · Stable at this time  · Continue propranolol 60 mg daily    VTE Pharmacologic Prophylaxis: VTE Score: 5 High Risk (Score >/= 5) - Pharmacological DVT Prophylaxis Ordered: enoxaparin (Lovenox)  Sequential Compression Devices Ordered    Code Status: Level 3 - DNAR and DNI   Discussion with family: Updated  (wife) at bedside  Anticipated Length of Stay: Patient will be admitted on an inpatient basis with an anticipated length of stay of greater than 2 midnights secondary to Sepsis of unknown source  Total Time for Visit, including Counseling / Coordination of Care: 30 minutes Greater than 50% of this total time spent on direct patient counseling and coordination of care  Chief Complaint:  Fever    History of Present Illness:  Sandra Butler is a 78 y o  male with a PMH of endocarditis with bioprosthetic valve replacement in 2013, hyperlipidemia, horseshoe kidney, CAD, CKD stage 3, BPH, hypothyroidism who presents with fever  Patient states he woke up this morning and had fever with T-max of a 105° per patient wife  Patient had associated rigors as well  Patient also states he did have diarrhea this morning  Patient has not had anything out of the ordinary in regards to diet denies any raw shellfish or undercooked meats at this time  Prior to today patient denies any fevers chills, nausea, or vomiting and states he was feeling himself  Patient denies any smoking history, drug use or alcohol use  Review of Systems:  Review of Systems   Constitutional: Positive for chills and fever  Negative for fatigue and unexpected weight change  HENT: Negative for congestion, ear pain, sore throat and tinnitus  Eyes: Negative for visual disturbance  Respiratory: Negative for cough, chest tightness, shortness of breath and wheezing  Cardiovascular: Negative for chest pain, palpitations and leg swelling  Gastrointestinal: Positive for diarrhea  Negative for abdominal distention, abdominal pain, blood in stool, constipation, nausea and vomiting  Genitourinary: Negative for dysuria and frequency  Musculoskeletal: Negative for back pain  Skin: Negative for rash     Neurological: Negative for dizziness, weakness, light-headedness, numbness and headaches  All other systems reviewed and are negative  Past Medical and Surgical History:   Past Medical History:   Diagnosis Date    Arthritis     Diabetes mellitus ([facility] [address] )     Disease of thyroid gland     Hypertension     MI [facility]ocardial infarction) ([facility] )     Renal disorder        Past Surgical History:   Procedure Laterality Date    BACK SURGERY      CARDIAC SURGERY      JOINT REPLACEMENT         Meds/Allergies:  Prior to Admission medications    Medication Sig Start Date End Date Taking?  Authorizing Provider   Alogliptin Benzoate 25 MG TABS Take 25 mg by mouth   Yes Historical Provider, MD   aspirin 81 mg chewable tablet Chew 81 mg daily   Yes Historical Provider, MD   cholecalciferol (VITAMIN D3) 1,000 units tablet Take 1,000 Units by mouth 2 (two) times a day   Yes Historical Provider, MD   gabapentin (NEURONTIN) 800 mg tablet Take 900 mg by mouth 3 (three) times a day   Yes Historical Provider, MD   insulin aspart (NovoLOG) 100 units/mL injection Inject 12 Units under the skin daily before dinner   Yes Historical Provider, MD   insulin aspart protamine-insulin aspart (NovoLOG 70/30) 100 units/mL injection Inject 22 Units under the skin daily   Yes Historical Provider, MD   insulin glargine (LANTUS) 100 units/mL subcutaneous injection Inject 28 Units under the skin daily at bedtime   Yes Historical Provider, MD   levothyroxine 50 mcg tablet Take 50 mcg by mouth daily   Yes Historical Provider, MD   loratadine (CLARITIN) 10 mg tablet Take 10 mg by mouth daily   Yes Historical Provider, MD   meloxicam (MOBIC) 7 5 mg tablet Take 7 5 mg by mouth as needed   Yes Historical Provider, MD   metFORMIN (GLUCOPHAGE) 500 mg tablet Take 500 mg by mouth 2 (two) times a day with meals   Yes Historical Provider, MD   pramipexole (MIRAPEX) 1 5 MG tablet Take 1 5 mg by mouth 2 (two) times a day   Yes Historical Provider, MD   pravastatin (PRAVACHOL) 40 mg tablet Take 40 mg by mouth daily   Yes Historical Provider, MD   propranolol (INDERAL) 60 mg tablet Take 60 mg by mouth daily   Yes Historical Provider, MD   sildenafil (VIAGRA) 100 mg tablet Take 100 mg by mouth daily as needed for erectile dysfunction   Yes Historical Provider, MD   tamsulosin (FLOMAX) 0 4 mg Take 0 4 mg by mouth 2 (two) times a day   Yes Historical Provider, MD   TiZANidine (ZANAFLEX) 4 MG capsule Take 4 mg by mouth as needed for muscle spasms   Yes Historical Provider, MD   traMADol (ULTRAM) 50 mg tablet Take 50 mg by mouth every 6 (six) hours as needed for moderate pain   Yes Historical Provider, MD   vitamin B-12 (VITAMIN B-12) 1,000 mcg tablet Take by mouth daily   Yes Historical Provider, MD     I have reviewed home medications with patient personally  Allergies: Allergies   Allergen Reactions    Cefazolin Anaphylaxis    Penicillins Anaphylaxis    Accupril [Quinapril Hcl] Other (See Comments)     unknown    Quinapril Other (See Comments)     unknown       Social History:  Marital Status: /Civil Union   Patient Pre-hospital Living Situation: Home  Patient Pre-hospital Level of Mobility: walks  Substance Use History:   Social History     Substance and Sexual Activity   Alcohol Use Never     Social History     Tobacco Use   Smoking Status Never Smoker   Smokeless Tobacco Current User     Social History     Substance and Sexual Activity   Drug Use Never       Family History:  History reviewed  No pertinent family history  Physical Exam:     Vitals:   Blood Pressure: 123/70 (09/03/21 1518)  Pulse: 84 (09/03/21 1518)  Temperature: (!) 101 1 °F (38 4 °C) (09/03/21 1518)  Temp Source: Oral (09/03/21 1146)  Respirations: 17 (09/03/21 1454)  Height: 6' 3" (190 5 cm) (09/03/21 1532)  Weight - Scale: 128 kg (281 lb 4 9 oz) (09/03/21 1146)  SpO2: 98 % (09/03/21 1518)    Physical Exam  Vitals and nursing note reviewed  Constitutional:       General: He is not in acute distress  Appearance: He is well-developed        Comments: Warm to touch HENT:      Head: Normocephalic and atraumatic  Eyes:      General: No scleral icterus  Conjunctiva/sclera: Conjunctivae normal    Cardiovascular:      Rate and Rhythm: Normal rate and regular rhythm  Heart sounds: Normal heart sounds  No murmur heard  No friction rub  No gallop  Pulmonary:      Effort: Pulmonary effort is normal  No respiratory distress  Breath sounds: Normal breath sounds  No wheezing or rales  Abdominal:      General: Bowel sounds are normal  There is no distension  Palpations: Abdomen is soft  Tenderness: There is no abdominal tenderness  Musculoskeletal:         General: Normal range of motion  Skin:     General: Skin is warm  Findings: No rash  Neurological:      Mental Status: He is alert and oriented to person, place, and time  Additional Data:     Lab Results:  Results from last 7 days   Lab Units 09/03/21  1149   WBC Thousand/uL 5 85   HEMOGLOBIN g/dL 14 1   HEMATOCRIT % 41 6   PLATELETS Thousands/uL 88*   NEUTROS PCT % 85*   LYMPHS PCT % 5*   MONOS PCT % 6   EOS PCT % 2     Results from last 7 days   Lab Units 09/03/21  1149   SODIUM mmol/L 138   POTASSIUM mmol/L 4 5   CHLORIDE mmol/L 103   CO2 mmol/L 24   BUN mg/dL 19   CREATININE mg/dL 1 22   ANION GAP mmol/L 11   CALCIUM mg/dL 8 6   ALBUMIN g/dL 3 3*   TOTAL BILIRUBIN mg/dL 0 97   ALK PHOS U/L 99   ALT U/L 485*   AST U/L 570*   GLUCOSE RANDOM mg/dL 184*                 Results from last 7 days   Lab Units 09/03/21  1338 09/03/21  1149   LACTIC ACID mmol/L 2 3* 2 2*       Imaging: Reviewed radiology reports from this admission including: chest xray and abdominal/pelvic CT  CT chest abdomen pelvis w contrast   Final Result by Fidel Skinner MD (09/03 8087)      1  Mild bronchial wall thickening, which may related to small airwas inflammation  Right basilar subsegmental atelectasis also present  2   No acute abnormality within the abdomen or pelvis        3   Several enhancing foci within the posterior right hepatic lobe, possibly representing hemangiomas  Further characterization with nonemergent liver MRI recommended  4   Horseshoe kidney  The study was marked in [facility] for immediate notification  Workstation performed: [id number]         XR chest 1 view portable   ED Interpretation by Lanny Ramos MD (09/03 1202)   NAD      Final Result by Kenya Cooney MD (09/03 8926)   Status post cardiac surgery      No acute cardiopulmonary disease  Workstation performed: [id number]         US right upper quadrant with liver dopplers    (Results Pending)       EKG and Other Studies Reviewed on Admission:   · EKG: NSR  HR 83     ** Please Note: This note has been constructed using a voice recognition system   **

## 2021-09-03 NOTE — PROGRESS NOTES
Vancomycin Assessment    Virginie Castro is a 78 y o  male who is starting vancomycin  for  fever of unknown source   Relevant clinical data and objective history reviewed:  Creatinine   Date Value Ref Range Status   09/03/2021 1 22 0 60 - 1 30 mg/dL Final     Comment:     Standardized to IDMS reference method     /70   Pulse 84   Temp (!) 101 1 °F (38 4 °C)   Resp 17   Ht 6' 3" (1 905 m)   Wt 128 kg (281 lb 4 9 oz)   SpO2 98%   BMI 35 16 kg/m²   No intake/output data recorded  Lab Results   Component Value Date/Time    BUN 19 09/03/2021 11:49 AM    WBC 5 85 09/03/2021 11:49 AM    HGB 14 1 09/03/2021 11:49 AM    HCT 41 6 09/03/2021 11:49 AM    MCV 84 09/03/2021 11:49 AM    PLT 88 (L) 09/03/2021 11:49 AM     Temp Readings from Last 3 Encounters:   09/03/21 (!) 101 1 °F (38 4 °C)     Vancomycin Days of Therapy: 1    Assessment/Plan  The patient is currently on vancomycin utilizing scheduled dosing based on adjusted body weight (due to obesity)  Baseline risks associated with therapy include: concomitant nephrotoxic medications and advanced age  The patient is ordered 2000 mg IV q12h and after clinical evaluation will be changed to 1500 mg IV q12h  Pharmacy will also follow closely for s/sx of nephrotoxicity, infusion reactions, and appropriateness of therapy  BMP and CBC will be ordered per protocol  Plan for trough as patient approaches steady state, 30 min before vanco dose due at 1630 on 9-5-21  Due to infection severity, will target a trough of 15-20 (appropriate for most indications)   Pharmacy will continue to follow the patients culture results and clinical progress daily      Amie Vyas, Pharmacist

## 2021-09-03 NOTE — ASSESSMENT & PLAN NOTE
· Platelets 88; upon chart review patient has had slight downtrend in platelets and baseline appears to be around 100  · May be secondary to sepsis

## 2021-09-03 NOTE — ASSESSMENT & PLAN NOTE
· Troponin 0 09; will trend at this time  · May be secondary to fever and underlying infection  · EKG-normal sinus rhythm with AV block noted; no ST or T-wave changes noted

## 2021-09-04 ENCOUNTER — APPOINTMENT (INPATIENT)
Dept: ULTRASOUND IMAGING | Facility: HOSPITAL | Age: 76
DRG: 872 | End: 2021-09-04
Payer: MEDICARE

## 2021-09-04 LAB
ALBUMIN SERPL BCP-MCNC: 3.1 G/DL (ref 3.5–5)
ALP SERPL-CCNC: 100 U/L (ref 46–116)
ALT SERPL W P-5'-P-CCNC: 370 U/L (ref 12–78)
ANION GAP SERPL CALCULATED.3IONS-SCNC: 11 MMOL/L (ref 4–13)
AST SERPL W P-5'-P-CCNC: 220 U/L (ref 5–45)
BACTERIA UR QL AUTO: NORMAL /HPF
BASOPHILS # BLD AUTO: 0.03 THOUSANDS/ΜL (ref 0–0.1)
BASOPHILS NFR BLD AUTO: 1 % (ref 0–1)
BILIRUB SERPL-MCNC: 1.35 MG/DL (ref 0.2–1)
BILIRUB UR QL STRIP: NEGATIVE
BUN SERPL-MCNC: 14 MG/DL (ref 5–25)
CALCIUM ALBUM COR SERPL-MCNC: 9.1 MG/DL (ref 8.3–10.1)
CALCIUM SERPL-MCNC: 8.4 MG/DL (ref 8.3–10.1)
CHLORIDE SERPL-SCNC: 105 MMOL/L (ref 100–108)
CLARITY UR: CLEAR
CO2 SERPL-SCNC: 22 MMOL/L (ref 21–32)
COLOR UR: YELLOW
CREAT SERPL-MCNC: 1.14 MG/DL (ref 0.6–1.3)
EOSINOPHIL # BLD AUTO: 0.22 THOUSAND/ΜL (ref 0–0.61)
EOSINOPHIL NFR BLD AUTO: 4 % (ref 0–6)
ERYTHROCYTE [DISTWIDTH] IN BLOOD BY AUTOMATED COUNT: 14.3 % (ref 11.6–15.1)
GFR SERPL CREATININE-BSD FRML MDRD: 63 ML/MIN/1.73SQ M
GLUCOSE SERPL-MCNC: 142 MG/DL (ref 65–140)
GLUCOSE SERPL-MCNC: 144 MG/DL (ref 65–140)
GLUCOSE SERPL-MCNC: 179 MG/DL (ref 65–140)
GLUCOSE SERPL-MCNC: 216 MG/DL (ref 65–140)
GLUCOSE SERPL-MCNC: 228 MG/DL (ref 65–140)
GLUCOSE UR STRIP-MCNC: NEGATIVE MG/DL
HCT VFR BLD AUTO: 42.9 % (ref 36.5–49.3)
HGB BLD-MCNC: 13.9 G/DL (ref 12–17)
HGB UR QL STRIP.AUTO: ABNORMAL
IMM GRANULOCYTES # BLD AUTO: 0.04 THOUSAND/UL (ref 0–0.2)
IMM GRANULOCYTES NFR BLD AUTO: 1 % (ref 0–2)
KETONES UR STRIP-MCNC: NEGATIVE MG/DL
LEUKOCYTE ESTERASE UR QL STRIP: NEGATIVE
LYMPHOCYTES # BLD AUTO: 0.59 THOUSANDS/ΜL (ref 0.6–4.47)
LYMPHOCYTES NFR BLD AUTO: 11 % (ref 14–44)
MAGNESIUM SERPL-MCNC: 2.1 MG/DL (ref 1.6–2.6)
MCH RBC QN AUTO: 28 PG (ref 26.8–34.3)
MCHC RBC AUTO-ENTMCNC: 32.4 G/DL (ref 31.4–37.4)
MCV RBC AUTO: 87 FL (ref 82–98)
MONOCYTES # BLD AUTO: 0.56 THOUSAND/ΜL (ref 0.17–1.22)
MONOCYTES NFR BLD AUTO: 10 % (ref 4–12)
NEUTROPHILS # BLD AUTO: 3.99 THOUSANDS/ΜL (ref 1.85–7.62)
NEUTS SEG NFR BLD AUTO: 73 % (ref 43–75)
NITRITE UR QL STRIP: NEGATIVE
NON-SQ EPI CELLS URNS QL MICRO: NORMAL /HPF
NRBC BLD AUTO-RTO: 0 /100 WBCS
PH UR STRIP.AUTO: 5.5 [PH]
PLATELET # BLD AUTO: 78 THOUSANDS/UL (ref 149–390)
PMV BLD AUTO: 9.9 FL (ref 8.9–12.7)
POTASSIUM SERPL-SCNC: 3.7 MMOL/L (ref 3.5–5.3)
PROCALCITONIN SERPL-MCNC: 0.91 NG/ML
PROT SERPL-MCNC: 6.6 G/DL (ref 6.4–8.2)
PROT UR STRIP-MCNC: NEGATIVE MG/DL
RBC # BLD AUTO: 4.96 MILLION/UL (ref 3.88–5.62)
RBC #/AREA URNS AUTO: NORMAL /HPF
SODIUM SERPL-SCNC: 138 MMOL/L (ref 136–145)
SP GR UR STRIP.AUTO: 1.02 (ref 1–1.03)
UROBILINOGEN UR QL STRIP.AUTO: 0.2 E.U./DL
WBC # BLD AUTO: 5.43 THOUSAND/UL (ref 4.31–10.16)
WBC #/AREA URNS AUTO: NORMAL /HPF

## 2021-09-04 PROCEDURE — 99233 SBSQ HOSP IP/OBS HIGH 50: CPT | Performed by: INTERNAL MEDICINE

## 2021-09-04 PROCEDURE — 76705 ECHO EXAM OF ABDOMEN: CPT

## 2021-09-04 PROCEDURE — 80053 COMPREHEN METABOLIC PANEL: CPT | Performed by: INTERNAL MEDICINE

## 2021-09-04 PROCEDURE — 83735 ASSAY OF MAGNESIUM: CPT | Performed by: INTERNAL MEDICINE

## 2021-09-04 PROCEDURE — 84145 PROCALCITONIN (PCT): CPT | Performed by: INTERNAL MEDICINE

## 2021-09-04 PROCEDURE — 85025 COMPLETE CBC W/AUTO DIFF WBC: CPT | Performed by: INTERNAL MEDICINE

## 2021-09-04 PROCEDURE — 82948 REAGENT STRIP/BLOOD GLUCOSE: CPT

## 2021-09-04 PROCEDURE — 81001 URINALYSIS AUTO W/SCOPE: CPT | Performed by: EMERGENCY MEDICINE

## 2021-09-04 RX ADMIN — AZTREONAM 2000 MG: 2 INJECTION, POWDER, LYOPHILIZED, FOR SOLUTION INTRAMUSCULAR; INTRAVENOUS at 00:22

## 2021-09-04 RX ADMIN — TAMSULOSIN HYDROCHLORIDE 0.4 MG: 0.4 CAPSULE ORAL at 16:15

## 2021-09-04 RX ADMIN — INSULIN LISPRO 2 UNITS: 100 INJECTION, SOLUTION INTRAVENOUS; SUBCUTANEOUS at 13:27

## 2021-09-04 RX ADMIN — VANCOMYCIN HYDROCHLORIDE 1500 MG: 1 INJECTION, POWDER, LYOPHILIZED, FOR SOLUTION INTRAVENOUS at 04:36

## 2021-09-04 RX ADMIN — LORATADINE 10 MG: 10 TABLET ORAL at 08:25

## 2021-09-04 RX ADMIN — AZTREONAM 2000 MG: 2 INJECTION, POWDER, LYOPHILIZED, FOR SOLUTION INTRAMUSCULAR; INTRAVENOUS at 22:47

## 2021-09-04 RX ADMIN — METRONIDAZOLE 500 MG: 500 INJECTION, SOLUTION INTRAVENOUS at 06:22

## 2021-09-04 RX ADMIN — OXYCODONE HYDROCHLORIDE 5 MG: 5 TABLET ORAL at 16:15

## 2021-09-04 RX ADMIN — GABAPENTIN 800 MG: 400 CAPSULE ORAL at 16:15

## 2021-09-04 RX ADMIN — HEPARIN SODIUM 5000 UNITS: 5000 INJECTION INTRAVENOUS; SUBCUTANEOUS at 06:23

## 2021-09-04 RX ADMIN — PRAMIPEXOLE DIHYDROCHLORIDE 1.5 MG: 0.5 TABLET ORAL at 18:07

## 2021-09-04 RX ADMIN — PROPRANOLOL HYDROCHLORIDE 60 MG: 20 TABLET ORAL at 08:25

## 2021-09-04 RX ADMIN — ACETAMINOPHEN 650 MG: 325 TABLET, FILM COATED ORAL at 19:40

## 2021-09-04 RX ADMIN — HEPARIN SODIUM 5000 UNITS: 5000 INJECTION INTRAVENOUS; SUBCUTANEOUS at 13:28

## 2021-09-04 RX ADMIN — OXYCODONE HYDROCHLORIDE 5 MG: 5 TABLET ORAL at 21:18

## 2021-09-04 RX ADMIN — INSULIN LISPRO 4 UNITS: 100 INJECTION, SOLUTION INTRAVENOUS; SUBCUTANEOUS at 16:22

## 2021-09-04 RX ADMIN — GABAPENTIN 800 MG: 400 CAPSULE ORAL at 21:17

## 2021-09-04 RX ADMIN — AZTREONAM 2000 MG: 2 INJECTION, POWDER, LYOPHILIZED, FOR SOLUTION INTRAMUSCULAR; INTRAVENOUS at 07:45

## 2021-09-04 RX ADMIN — MAGNESIUM SULFATE HEPTAHYDRATE 2 G: 40 INJECTION, SOLUTION INTRAVENOUS at 02:08

## 2021-09-04 RX ADMIN — INSULIN LISPRO 2 UNITS: 100 INJECTION, SOLUTION INTRAVENOUS; SUBCUTANEOUS at 21:17

## 2021-09-04 RX ADMIN — GABAPENTIN 800 MG: 400 CAPSULE ORAL at 08:25

## 2021-09-04 RX ADMIN — INSULIN ASPART 22 UNITS: 100 INJECTION, SUSPENSION SUBCUTANEOUS at 08:29

## 2021-09-04 RX ADMIN — AZTREONAM 2000 MG: 2 INJECTION, POWDER, LYOPHILIZED, FOR SOLUTION INTRAMUSCULAR; INTRAVENOUS at 17:00

## 2021-09-04 RX ADMIN — METRONIDAZOLE 500 MG: 500 INJECTION, SOLUTION INTRAVENOUS at 13:28

## 2021-09-04 RX ADMIN — METRONIDAZOLE 500 MG: 500 INJECTION, SOLUTION INTRAVENOUS at 21:18

## 2021-09-04 RX ADMIN — VANCOMYCIN HYDROCHLORIDE 1500 MG: 1 INJECTION, POWDER, LYOPHILIZED, FOR SOLUTION INTRAVENOUS at 18:06

## 2021-09-04 RX ADMIN — INSULIN LISPRO 12 UNITS: 100 INJECTION, SOLUTION INTRAVENOUS; SUBCUTANEOUS at 16:22

## 2021-09-04 RX ADMIN — LEVOTHYROXINE SODIUM 50 MCG: 50 TABLET ORAL at 08:25

## 2021-09-04 RX ADMIN — OXYCODONE HYDROCHLORIDE 5 MG: 5 TABLET ORAL at 04:58

## 2021-09-04 RX ADMIN — HEPARIN SODIUM 5000 UNITS: 5000 INJECTION INTRAVENOUS; SUBCUTANEOUS at 21:17

## 2021-09-04 RX ADMIN — ASPIRIN 81 MG CHEWABLE TABLET 81 MG: 81 TABLET CHEWABLE at 08:25

## 2021-09-04 RX ADMIN — PRAMIPEXOLE DIHYDROCHLORIDE 1.5 MG: 0.5 TABLET ORAL at 08:25

## 2021-09-04 RX ADMIN — HYDROMORPHONE HYDROCHLORIDE 0.5 MG: 1 INJECTION, SOLUTION INTRAMUSCULAR; INTRAVENOUS; SUBCUTANEOUS at 04:22

## 2021-09-04 RX ADMIN — INSULIN GLARGINE 28 UNITS: 100 INJECTION, SOLUTION SUBCUTANEOUS at 21:17

## 2021-09-04 NOTE — ASSESSMENT & PLAN NOTE
· Noted to have elevated AST and ALT  · Hepatitis panel negative  · Right upper quadrant ultrasound reveals cholelithiasis but no cholecystitis, no CBD dilation  · Etiology may be secondary to sepsis and fatty liver; will continue monitor    Monitor CMP

## 2021-09-04 NOTE — ASSESSMENT & PLAN NOTE
· Mildly elevated troponin with flat curve is likely non MI troponin elevation  Patient denies any chest pain

## 2021-09-04 NOTE — PROGRESS NOTES
[address] [facility]  Progress Note Clarke Miguel 1945, 78 y o  male MRN: [id number]  Unit/Bed#: -Susan Encounter: [id number]  Primary Care Provider: Cong Dai Pharmacist   Date and time admitted to hospital: 9/3/2021 11:36 AM    * Sepsis [facility])  Assessment & Plan  · Patient stated he woke up with fever and rigors the morning of admission and did state he had an episode of diarrhea today  Patient denies eating anything out of the ordinary yesterday and denies eating any shellfish or undercooked meats  · Patient is vaccinated and no on else in the family is currently sick  · Sepsis with unknown source; patient with temp of 101 7°, respirations 22, platelets 88, and lactic acid of 2 3  · AST and ALT markedly elevated  · CT chest abdomen pelvis-mild bronchial wall thickening; no acute abnormality within abdomen or pelvis; several enhancing foci within posterior right hepatic lobe possibly representing hemangiomas; horseshoe kidney  · Chest x-ray-no acute cardiopulmonary disease  · Lactic acid 2 3  · WBC count normal    Possibilities include tracheobronchitis, infectious diarrhea/viral now resolved, or more concerning left knee prosthetic joint infection    Plan  · Continue aztreonam given cefazolin and penicillin allergy  · Continue vancomycin  · Follow-up cultures  · Check Lyme, parasite smear, babesia, anaplasma  · Obtain orthopedics consultation given L knee pain and hx of joint infection  · Monitor clinically, temperature curve, white count    Thrombocytopathia (HCC)  Assessment & Plan  · Platelets 88; upon chart review patient has had slight downtrend in platelets and baseline appears to be around 100  · May be secondary to sepsis  · No signs of bleeding  · Monitor    Elevated troponin  Assessment & Plan  · Mildly elevated troponin with flat curve is likely non MI troponin elevation  Patient denies any chest pain      Transaminitis  Assessment & Plan  · Noted to have elevated AST and ALT  · Hepatitis panel negative  · Right upper quadrant ultrasound reveals cholelithiasis but no cholecystitis, no CBD dilation  · Etiology may be secondary to sepsis and fatty liver; will continue monitor    Monitor CMP    Hypothyroidism  Assessment & Plan  Continue levothyroxine 50 mcg daily    Hypomagnesemia  Assessment & Plan  · Magnesium was replaced  · Currently much better    Horseshoe kidney with renal calculus  Assessment & Plan  Plan is under CKD    History of endocarditis  Assessment & Plan  · 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  CKD (chronic kidney disease) stage 3, GFR 30-59 ml/min [facility])  Assessment & Plan  Lab Results   Component Value Date    EGFR 63 09/04/2021    EGFR 58 09/03/2021    CREATININE 1 14 09/04/2021    CREATININE 1 22 09/03/2021   ·   · Patient with history of horseshoe kidney  · Creatinine at baseline currently; baseline appears to be 1-1 2  · Continue to monitor    Benign prostatic hyperplasia without lower urinary tract symptoms  Assessment & Plan  Continue tamsulosin    CAD (coronary artery disease)  Assessment & Plan  · Stable at this time  · Continue propranolol 60 mg daily    VTE Pharmacologic Prophylaxis:   Pharmacologic: Pharmacologic VTE Prophylaxis contraindicated due to Heparin  Mechanical VTE Prophylaxis in Place: Yes    Patient Centered Rounds: I have performed bedside rounds with nursing staff today  Discussions with Specialists or Other Care Team Provider:  Discussed with care management team    Education and Discussions with Family / Patient:  Discussed with family at the bedside    Time Spent for Care: 30 minutes  More than 50% of total time spent on counseling and coordination of care as described above      Current Length of Stay: 1 day(s)    Current Patient Status: Inpatient   Certification Statement: The patient will continue to require additional inpatient hospital stay due to Need for IV antibiotics    Discharge Plan:  Once stable    Code Status: Level 3 - DNAR and DNI      Subjective:     Patient evaluated this morning  He does mentions significant pain in his left knee  Fairly tender to palpation  Denies any nausea vomiting diarrhea constipation  No other events reported  Objective:     Vitals:   Temp (24hrs), Av 7 °F (38 2 °C), Min:98 1 °F (36 7 °C), Max:102 7 °F (39 3 °C)    Temp:  [98 1 °F (36 7 °C)-102 7 °F (39 3 °C)] 99 5 °F (37 5 °C)  HR:  [66-85] 67  Resp:  [17-18] 17  BP: (113-146)/(61-72) 124/64  SpO2:  [92 %-98 %] 96 %  Body mass index is 35 16 kg/m²  Input and Output Summary (last 24 hours): Intake/Output Summary (Last 24 hours) at 2021 1438  Last data filed at 2021 0910  Gross per 24 hour   Intake 240 ml   Output 1500 ml   Net -1260 ml       Physical Exam:     Physical Exam  Vitals and nursing note reviewed  Constitutional:       Appearance: Normal appearance  Comments: Elderly male in bed, awake   HENT:      Head: Normocephalic and atraumatic  Right Ear: External ear normal       Left Ear: External ear normal       Nose: Nose normal  No congestion or rhinorrhea  Mouth/Throat:      Mouth: Mucous membranes are moist       Pharynx: Oropharynx is clear  No oropharyngeal exudate or posterior oropharyngeal erythema  Eyes:      General: No scleral icterus  Right eye: No discharge  Left eye: No discharge  Pupils: Pupils are equal, round, and reactive to light  Neck:      Vascular: No carotid bruit  Cardiovascular:      Rate and Rhythm: Normal rate and regular rhythm  Pulses: Normal pulses  Heart sounds: No murmur heard  No friction rub  No gallop  Pulmonary:      Effort: Pulmonary effort is normal  No respiratory distress  Breath sounds: Normal breath sounds  No stridor  No wheezing, rhonchi or rales  Abdominal:      General: Abdomen is flat  Bowel sounds are normal  There is no distension  Palpations: Abdomen is soft  There is no mass  Tenderness: There is no abdominal tenderness  There is no guarding or rebound  Hernia: No hernia is present  Musculoskeletal:         General: Swelling and tenderness present  No deformity or signs of injury  Normal range of motion  Cervical back: Normal range of motion  No rigidity  No muscular tenderness  Comments: Tenderness and swelling in the left knee   Lymphadenopathy:      Cervical: No cervical adenopathy  Skin:     General: Skin is warm and dry  Capillary Refill: Capillary refill takes less than 2 seconds  Coloration: Skin is not jaundiced or pale  Findings: No bruising or erythema  Neurological:      General: No focal deficit present  Mental Status: He is alert and oriented to person, place, and time  Mental status is at baseline  Cranial Nerves: No cranial nerve deficit  Sensory: No sensory deficit  Motor: No weakness  Coordination: Coordination normal       Deep Tendon Reflexes: Reflexes normal    Psychiatric:         Mood and Affect: Mood normal          Behavior: Behavior normal          Thought Content:  Thought content normal          Judgment: Judgment normal            Additional Data:     Labs:    Results from last 7 days   Lab Units 09/04/21  0642   WBC Thousand/uL 5 43   HEMOGLOBIN g/dL 13 9   HEMATOCRIT % 42 9   PLATELETS Thousands/uL 78*   NEUTROS PCT % 73   LYMPHS PCT % 11*   MONOS PCT % 10   EOS PCT % 4     Results from last 7 days   Lab Units 09/04/21  0451   SODIUM mmol/L 138   POTASSIUM mmol/L 3 7   CHLORIDE mmol/L 105   CO2 mmol/L 22   BUN mg/dL 14   CREATININE mg/dL 1 14   ANION GAP mmol/L 11   CALCIUM mg/dL 8 4   ALBUMIN g/dL 3 1*   TOTAL BILIRUBIN mg/dL 1 35*   ALK PHOS U/L 100   ALT U/L 370*   AST U/L 220*   GLUCOSE RANDOM mg/dL 142*     Results from last 7 days   Lab Units 09/03/21  1659   INR  1 18     Results from last 7 days   Lab Units 09/04/21  1120 09/04/21  0714 09/03/21  2133   POC GLUCOSE mg/dl 179* 144* 140         Results from last 7 days   Lab Units 09/04/21  0451 09/03/21  1659 09/03/21  1338 09/03/21  1149   LACTIC ACID mmol/L  --  1 9 2 3* 2 2*   PROCALCITONIN ng/ml 0 91* 1 39*  --   --            * I Have Reviewed All Lab Data Listed Above  * Additional Pertinent Lab Tests Reviewed: Maria A Egan Admission Reviewed      Recent Cultures (last 7 days):     Results from last 7 days   Lab Units 09/03/21  1338 09/03/21  1149   BLOOD CULTURE  Received in Microbiology Lab  Culture in Progress    --    GRAM STAIN RESULT   --  Gram negative rods*       Last 24 Hours Medication List:   Current Facility-Administered Medications   Medication Dose Route Frequency Provider Last Rate    acetaminophen  650 mg Oral Q6H PRN Lalitha Weinert, DO      aspirin  81 mg Oral Daily Lalitha Wagner, DO      aztreonam  2,000 mg Intravenous Q8H Lalitha Wagner, DO 2,000 mg (09/04/21 0745)    gabapentin  800 mg Oral TID Lalitha Wagner, DO      heparin (porcine)  5,000 Units Subcutaneous Count includes the [facility] Lalitha Wagner, DO      insulin aspart protamine-insulin aspart  22 Units Subcutaneous Daily Lalitha Wagner, DO      insulin glargine  28 Units Subcutaneous HS Lalitha Wagner, DO      insulin lispro  1-5 Units Subcutaneous HS Lalitha Wagner, DO      insulin lispro  12 Units Subcutaneous Daily With Colby Chase, DO      insulin lispro  2-12 Units Subcutaneous TID [facility] Hassshantal Weinert, DO      levothyroxine  50 mcg Oral Daily Lalitha Weinert, DO      loratadine  10 mg Oral Daily Lalitha Weinert, DO      metroNIDAZOLE  500 mg Intravenous Q8H Lalitha Wagner,  mg (09/04/21 1328)    oxyCODONE  5 mg Oral Q4H PRN Lalitha Wagner, DO      pramipexole  1 5 mg Oral BID Lalitha Wagner, DO      propranolol  60 mg Oral Daily Lalitha Wagner, DO      tamsulosin  0 4 mg Oral Daily With Colby Chase, DO      vancomycin  15 mg/kg (Adjusted) Intravenous Q12H Jovanni Meléndez,           Today, Patient Was Seen By: Sharath Liz MD    ** Please Note: Dictation voice to text software may have been used in the creation of this document   **

## 2021-09-04 NOTE — ASSESSMENT & PLAN NOTE
· Platelets 88; upon chart review patient has had slight downtrend in platelets and baseline appears to be around 100  · May be secondary to sepsis  · No signs of bleeding  · Monitor

## 2021-09-04 NOTE — PROGRESS NOTES
Vancomycin IV Pharmacy-to-Dose Consultation    Rayna James is a 78 y o  male who is currently receiving Vancomycin IV with management by the Pharmacy Consult service  Assessment/Plan:  The patient was reviewed  Renal function is stable and no signs or symptoms of nephrotoxicity and/or infusion reactions were documented in the chart  Based on todays assessment, continue current vancomycin (day # 2) dosing of 1500mg q12h, with a plan for trough to be drawn at 1530 on 9/5/21  We will continue to follow the patients culture results and clinical progress daily      Shubham Felipe, Pharmacist

## 2021-09-04 NOTE — ASSESSMENT & PLAN NOTE
· Patient stated he woke up with fever and rigors the morning of admission and did state he had an episode of diarrhea today    Patient denies eating anything out of the ordinary yesterday and denies eating any shellfish or undercooked meats  · Patient is vaccinated and no on else in the family is currently sick  · Sepsis with unknown source; patient with temp of 101 7°, respirations 22, platelets 88, and lactic acid of 2 3  · AST and ALT markedly elevated  · CT chest abdomen pelvis-mild bronchial wall thickening; no acute abnormality within abdomen or pelvis; several enhancing foci within posterior right hepatic lobe possibly representing hemangiomas; horseshoe kidney  · Chest x-ray-no acute cardiopulmonary disease  · Lactic acid 2 3  · WBC count normal    Possibilities include tracheobronchitis, infectious diarrhea/viral now resolved, or more concerning left knee prosthetic joint infection    Plan  · Continue aztreonam given cefazolin and penicillin allergy  · Continue vancomycin  · Follow-up cultures  · Check Lyme, parasite smear, babesia, anaplasma  · Obtain orthopedics consultation given L knee pain and hx of joint infection  · Monitor clinically, temperature curve, white count

## 2021-09-04 NOTE — ASSESSMENT & PLAN NOTE
Lab Results   Component Value Date    EGFR 63 09/04/2021    EGFR 58 09/03/2021    CREATININE 1 14 09/04/2021    CREATININE 1 22 09/03/2021   ·   · Patient with history of horseshoe kidney  · Creatinine at baseline currently; baseline appears to be 1-1 2  · Continue to monitor

## 2021-09-05 ENCOUNTER — APPOINTMENT (INPATIENT)
Dept: RADIOLOGY | Facility: HOSPITAL | Age: 76
DRG: 872 | End: 2021-09-05
Payer: MEDICARE

## 2021-09-05 PROBLEM — R78.81 GRAM-NEGATIVE BACTEREMIA: Status: ACTIVE | Noted: 2021-09-05

## 2021-09-05 LAB
ALBUMIN SERPL BCP-MCNC: 2.8 G/DL (ref 3.5–5)
ALP SERPL-CCNC: 108 U/L (ref 46–116)
ALT SERPL W P-5'-P-CCNC: 224 U/L (ref 12–78)
ANION GAP SERPL CALCULATED.3IONS-SCNC: 10 MMOL/L (ref 4–13)
AST SERPL W P-5'-P-CCNC: 87 U/L (ref 5–45)
BASOPHILS # BLD AUTO: 0.04 THOUSANDS/ΜL (ref 0–0.1)
BASOPHILS NFR BLD AUTO: 1 % (ref 0–1)
BILIRUB SERPL-MCNC: 0.9 MG/DL (ref 0.2–1)
BUN SERPL-MCNC: 12 MG/DL (ref 5–25)
CALCIUM ALBUM COR SERPL-MCNC: 9.6 MG/DL (ref 8.3–10.1)
CALCIUM SERPL-MCNC: 8.6 MG/DL (ref 8.3–10.1)
CHLORIDE SERPL-SCNC: 104 MMOL/L (ref 100–108)
CO2 SERPL-SCNC: 22 MMOL/L (ref 21–32)
CREAT SERPL-MCNC: 0.96 MG/DL (ref 0.6–1.3)
EOSINOPHIL # BLD AUTO: 0.49 THOUSAND/ΜL (ref 0–0.61)
EOSINOPHIL NFR BLD AUTO: 11 % (ref 0–6)
ERYTHROCYTE [DISTWIDTH] IN BLOOD BY AUTOMATED COUNT: 14 % (ref 11.6–15.1)
GFR SERPL CREATININE-BSD FRML MDRD: 78 ML/MIN/1.73SQ M
GLUCOSE SERPL-MCNC: 165 MG/DL (ref 65–140)
GLUCOSE SERPL-MCNC: 187 MG/DL (ref 65–140)
GLUCOSE SERPL-MCNC: 233 MG/DL (ref 65–140)
GLUCOSE SERPL-MCNC: 261 MG/DL (ref 65–140)
HCT VFR BLD AUTO: 45.5 % (ref 36.5–49.3)
HGB BLD-MCNC: 15.1 G/DL (ref 12–17)
IMM GRANULOCYTES # BLD AUTO: 0.02 THOUSAND/UL (ref 0–0.2)
IMM GRANULOCYTES NFR BLD AUTO: 0 % (ref 0–2)
LYMPHOCYTES # BLD AUTO: 0.6 THOUSANDS/ΜL (ref 0.6–4.47)
LYMPHOCYTES NFR BLD AUTO: 13 % (ref 14–44)
MCH RBC QN AUTO: 28.1 PG (ref 26.8–34.3)
MCHC RBC AUTO-ENTMCNC: 33.2 G/DL (ref 31.4–37.4)
MCV RBC AUTO: 85 FL (ref 82–98)
MONOCYTES # BLD AUTO: 0.43 THOUSAND/ΜL (ref 0.17–1.22)
MONOCYTES NFR BLD AUTO: 10 % (ref 4–12)
NEUTROPHILS # BLD AUTO: 2.95 THOUSANDS/ΜL (ref 1.85–7.62)
NEUTS SEG NFR BLD AUTO: 65 % (ref 43–75)
NRBC BLD AUTO-RTO: 0 /100 WBCS
PLATELET # BLD AUTO: 83 THOUSANDS/UL (ref 149–390)
PMV BLD AUTO: 9.5 FL (ref 8.9–12.7)
POTASSIUM SERPL-SCNC: 4.6 MMOL/L (ref 3.5–5.3)
PROT SERPL-MCNC: 6.6 G/DL (ref 6.4–8.2)
RBC # BLD AUTO: 5.37 MILLION/UL (ref 3.88–5.62)
SODIUM SERPL-SCNC: 136 MMOL/L (ref 136–145)
VANCOMYCIN TROUGH SERPL-MCNC: 14.1 UG/ML (ref 10–20)
WBC # BLD AUTO: 4.53 THOUSAND/UL (ref 4.31–10.16)

## 2021-09-05 PROCEDURE — 73502 X-RAY EXAM HIP UNI 2-3 VIEWS: CPT

## 2021-09-05 PROCEDURE — 80053 COMPREHEN METABOLIC PANEL: CPT | Performed by: INTERNAL MEDICINE

## 2021-09-05 PROCEDURE — 99222 1ST HOSP IP/OBS MODERATE 55: CPT | Performed by: PHYSICIAN ASSISTANT

## 2021-09-05 PROCEDURE — 86618 LYME DISEASE ANTIBODY: CPT | Performed by: INTERNAL MEDICINE

## 2021-09-05 PROCEDURE — 86753 PROTOZOA ANTIBODY NOS: CPT | Performed by: INTERNAL MEDICINE

## 2021-09-05 PROCEDURE — 99232 SBSQ HOSP IP/OBS MODERATE 35: CPT | Performed by: INTERNAL MEDICINE

## 2021-09-05 PROCEDURE — 87798 DETECT AGENT NOS DNA AMP: CPT | Performed by: INTERNAL MEDICINE

## 2021-09-05 PROCEDURE — 73560 X-RAY EXAM OF KNEE 1 OR 2: CPT

## 2021-09-05 PROCEDURE — 80202 ASSAY OF VANCOMYCIN: CPT | Performed by: INTERNAL MEDICINE

## 2021-09-05 PROCEDURE — 85025 COMPLETE CBC W/AUTO DIFF WBC: CPT | Performed by: INTERNAL MEDICINE

## 2021-09-05 PROCEDURE — 82948 REAGENT STRIP/BLOOD GLUCOSE: CPT

## 2021-09-05 RX ORDER — HYDROMORPHONE HCL/PF 1 MG/ML
0.5 SYRINGE (ML) INJECTION EVERY 6 HOURS PRN
Status: DISCONTINUED | OUTPATIENT
Start: 2021-09-05 | End: 2021-09-10 | Stop reason: HOSPADM

## 2021-09-05 RX ORDER — GUAIFENESIN 600 MG
600 TABLET, EXTENDED RELEASE 12 HR ORAL EVERY 12 HOURS SCHEDULED
Status: DISCONTINUED | OUTPATIENT
Start: 2021-09-05 | End: 2021-09-10 | Stop reason: HOSPADM

## 2021-09-05 RX ADMIN — INSULIN LISPRO 4 UNITS: 100 INJECTION, SOLUTION INTRAVENOUS; SUBCUTANEOUS at 17:11

## 2021-09-05 RX ADMIN — GABAPENTIN 800 MG: 400 CAPSULE ORAL at 09:51

## 2021-09-05 RX ADMIN — HYDROMORPHONE HYDROCHLORIDE 0.5 MG: 1 INJECTION, SOLUTION INTRAMUSCULAR; INTRAVENOUS; SUBCUTANEOUS at 17:11

## 2021-09-05 RX ADMIN — INSULIN LISPRO 2 UNITS: 100 INJECTION, SOLUTION INTRAVENOUS; SUBCUTANEOUS at 10:05

## 2021-09-05 RX ADMIN — HEPARIN SODIUM 5000 UNITS: 5000 INJECTION INTRAVENOUS; SUBCUTANEOUS at 13:26

## 2021-09-05 RX ADMIN — GUAIFENESIN 600 MG: 600 TABLET ORAL at 20:43

## 2021-09-05 RX ADMIN — GABAPENTIN 800 MG: 400 CAPSULE ORAL at 20:43

## 2021-09-05 RX ADMIN — PROPRANOLOL HYDROCHLORIDE 60 MG: 20 TABLET ORAL at 09:51

## 2021-09-05 RX ADMIN — INSULIN ASPART 22 UNITS: 100 INJECTION, SUSPENSION SUBCUTANEOUS at 10:05

## 2021-09-05 RX ADMIN — PRAMIPEXOLE DIHYDROCHLORIDE 1.5 MG: 0.5 TABLET ORAL at 09:50

## 2021-09-05 RX ADMIN — HEPARIN SODIUM 5000 UNITS: 5000 INJECTION INTRAVENOUS; SUBCUTANEOUS at 22:45

## 2021-09-05 RX ADMIN — METRONIDAZOLE 500 MG: 500 INJECTION, SOLUTION INTRAVENOUS at 05:01

## 2021-09-05 RX ADMIN — LORATADINE 10 MG: 10 TABLET ORAL at 09:50

## 2021-09-05 RX ADMIN — GABAPENTIN 800 MG: 400 CAPSULE ORAL at 17:08

## 2021-09-05 RX ADMIN — OXYCODONE HYDROCHLORIDE 5 MG: 5 TABLET ORAL at 06:45

## 2021-09-05 RX ADMIN — ASPIRIN 81 MG CHEWABLE TABLET 81 MG: 81 TABLET CHEWABLE at 09:51

## 2021-09-05 RX ADMIN — VANCOMYCIN HYDROCHLORIDE 1500 MG: 1 INJECTION, POWDER, LYOPHILIZED, FOR SOLUTION INTRAVENOUS at 18:45

## 2021-09-05 RX ADMIN — AZTREONAM 2000 MG: 2 INJECTION, POWDER, LYOPHILIZED, FOR SOLUTION INTRAMUSCULAR; INTRAVENOUS at 17:06

## 2021-09-05 RX ADMIN — INSULIN LISPRO 12 UNITS: 100 INJECTION, SOLUTION INTRAVENOUS; SUBCUTANEOUS at 17:12

## 2021-09-05 RX ADMIN — METRONIDAZOLE 500 MG: 500 INJECTION, SOLUTION INTRAVENOUS at 13:26

## 2021-09-05 RX ADMIN — METRONIDAZOLE 500 MG: 500 INJECTION, SOLUTION INTRAVENOUS at 22:42

## 2021-09-05 RX ADMIN — OXYCODONE HYDROCHLORIDE 5 MG: 5 TABLET ORAL at 13:41

## 2021-09-05 RX ADMIN — GUAIFENESIN 600 MG: 600 TABLET ORAL at 09:51

## 2021-09-05 RX ADMIN — INSULIN LISPRO 2 UNITS: 100 INJECTION, SOLUTION INTRAVENOUS; SUBCUTANEOUS at 22:44

## 2021-09-05 RX ADMIN — HYDROMORPHONE HYDROCHLORIDE 0.5 MG: 1 INJECTION, SOLUTION INTRAMUSCULAR; INTRAVENOUS; SUBCUTANEOUS at 10:11

## 2021-09-05 RX ADMIN — VANCOMYCIN HYDROCHLORIDE 1500 MG: 1 INJECTION, POWDER, LYOPHILIZED, FOR SOLUTION INTRAVENOUS at 05:01

## 2021-09-05 RX ADMIN — HEPARIN SODIUM 5000 UNITS: 5000 INJECTION INTRAVENOUS; SUBCUTANEOUS at 05:01

## 2021-09-05 RX ADMIN — INSULIN LISPRO 2 UNITS: 100 INJECTION, SOLUTION INTRAVENOUS; SUBCUTANEOUS at 13:26

## 2021-09-05 RX ADMIN — LEVOTHYROXINE SODIUM 50 MCG: 50 TABLET ORAL at 09:50

## 2021-09-05 RX ADMIN — PRAMIPEXOLE DIHYDROCHLORIDE 1.5 MG: 0.5 TABLET ORAL at 17:09

## 2021-09-05 RX ADMIN — OXYCODONE HYDROCHLORIDE 5 MG: 5 TABLET ORAL at 20:43

## 2021-09-05 RX ADMIN — TAMSULOSIN HYDROCHLORIDE 0.4 MG: 0.4 CAPSULE ORAL at 17:09

## 2021-09-05 RX ADMIN — AZTREONAM 2000 MG: 2 INJECTION, POWDER, LYOPHILIZED, FOR SOLUTION INTRAMUSCULAR; INTRAVENOUS at 23:44

## 2021-09-05 RX ADMIN — AZTREONAM 2000 MG: 2 INJECTION, POWDER, LYOPHILIZED, FOR SOLUTION INTRAMUSCULAR; INTRAVENOUS at 09:52

## 2021-09-05 RX ADMIN — INSULIN GLARGINE 28 UNITS: 100 INJECTION, SOLUTION SUBCUTANEOUS at 22:44

## 2021-09-05 NOTE — CONSULTS
Orthopedics   Gino Herrera 78 y o  male MRN: [id number]  Unit/Bed#: [facility]      Chief Complaint:   Left knee pain    HPI:  78 y o  male with PMH of total joint infection 2013, MI, endocarditis s/p bioprosthetic aortic valve placed 2013, spine surgery April 2020, diabetes mellitus, hypertension complaining of Left medial sided knee pain and occasional Left knee swelling  Patient reports history of Left TKA in 2013 and approximately 1 month post op had prosthetic joint infection which seeded in aortic valve  At that time, patient underwent poly exchange and antibiotic therapy and underwent Left total knee revision  Since surgical intervention, patient notes occasional swelling of Left knee, especially when in the shower  Patient reports it swells up and after lying in bed with elevation, swelling is resolved  Today, patient locates his pain to medial and posterior aspects of Left thigh with some groin pain, noted historically  Tenderness to touch does radiate though medial hamstrings and at pes anserinus bursa  Admits to chronic numbness and tingling in bilateral feet 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  Patient also admits to Right knee fusion in 1974 secondary to mechanical fall and sustaining fracture  Patient is currently residing in [place] and is in for a family reunion; he was admitted to hospital secondary to high fevers, cough, and chest pain he was experiencing yesterday  Patient also admits to regular appointments with vascular surgery in [place]  Approximately 3 months ago, patient had a vascular procedure performed where "a vein in Left thigh was drained"  At this time, patient denies chest pain, shortness of breath, chills, fevers  He ambulates with walker at baseline secondary to Right knee fusion  He also attends PT through the [place] near his home       Review Of Systems:   · Skin: Normal  · Neuro: See HPI  · Musculoskeletal: See HPI  · 14 point review of systems negative except as stated above     Past Medical History:   Past Medical History:   Diagnosis Date    Arthritis     [facility]     Disease of thyroid gland     Hypertension     [facility]     Renal disorder        Past Surgical History:   Past Surgical History:   Procedure Laterality Date    BACK SURGERY      CARDIAC SURGERY      JOINT REPLACEMENT         Family History:  Family history reviewed and non-contributory  History reviewed  No pertinent family history  Social History:  Social History     Socioeconomic History    Marital status: /Civil Union     Spouse name: None    Number of children: None    Years of education: None    Highest education level: None   Occupational History    None   Tobacco Use    Smoking status: Never Smoker    Smokeless tobacco: Current User   Vaping Use    Vaping Use: Never used   Substance and Sexual Activity    Alcohol use: Never    Drug use: Never    Sexual activity: None   Other Topics Concern    None   Social History Narrative    None     Social Determinants of Health     Financial Resource Strain:     Difficulty of Paying Living Expenses:    Food Insecurity:     Worried About Running Out of Food in the Last Year:     Ran Out of Food in the Last Year:    Transportation Needs:     Lack of Transportation (Medical):  Lack of Transportation (Non-Medical):    Physical Activity:     Days of Exercise per Week:     Minutes of Exercise per Session:    Stress:     Feeling of Stress :    Social Connections:     Frequency of Communication with Friends and Family:     Frequency of Social Gatherings with Friends and Family:     Attends Jew Services:     Active Member of Clubs or Organizations:     Attends Club or Organization Meetings:     Marital Status:    Intimate Partner Violence:     Fear of Current or Ex-Partner:     Emotionally Abused:     Physically Abused:     Sexually Abused: Allergies:    Allergies   Allergen Reactions    Cefazolin Anaphylaxis    Penicillins Anaphylaxis    Accupril [Quinapril Hcl] Other (See Comments)     unknown    Quinapril Other (See Comments)     unknown           Labs:  0   Lab Value Date/Time    HCT 45 5 09/05/2021 0651    HCT 42 9 09/04/2021 0642    HCT 41 6 09/03/2021 1149    HGB 15 1 09/05/2021 0651    HGB 13 9 09/04/2021 0642    HGB 14 1 09/03/2021 1149    INR 1 18 09/03/2021 1659    WBC 4 53 09/05/2021 0651    WBC 5 43 09/04/2021 0642    WBC 5 85 09/03/2021 1149       Meds:    Current Facility-Administered Medications:     acetaminophen (TYLENOL) tablet 650 mg, 650 mg, Oral, Q6H PRN, Excell Clines, DO, 650 mg at 09/04/21 1940    aspirin chewable tablet 81 mg, 81 mg, Oral, Daily, Excell Clines, DO, 81 mg at 09/04/21 0825    aztreonam (AZACTAM) 2,000 mg in sodium chloride 0 9 % 100 mL IVPB, 2,000 mg, Intravenous, Q8H, Excell Clines, DO, Last Rate: 100 mL/hr at 09/04/21 2247, 2,000 mg at 09/04/21 2247    gabapentin (NEURONTIN) capsule 800 mg, 800 mg, Oral, TID, Excell Clines, DO, 800 mg at 09/04/21 2117    heparin (porcine) subcutaneous injection 5,000 Units, 5,000 Units, Subcutaneous, Q8H [address], Excell Clines, DO, 5,000 Units at 09/05/21 0501    insulin aspart protamine-insulin aspart (NovoLOG 70/30) 100 units/mL subcutaneous injection 22 Units, 22 Units, Subcutaneous, Daily, Excell Clines, DO, 22 Units at 09/04/21 0829    insulin glargine (LANTUS) subcutaneous injection 28 Units 0 28 mL, 28 Units, Subcutaneous, HS, Excell Clines, DO, 28 Units at 09/04/21 2117    insulin lispro (HumaLOG) 100 units/mL subcutaneous injection 1-5 Units, 1-5 Units, Subcutaneous, HS, Excell Clines, DO, 2 Units at 09/04/21 2117    insulin lispro (HumaLOG) 100 units/mL subcutaneous injection 12 Units, 12 Units, Subcutaneous, Daily With Janell Cunningham DO, 12 Units at 09/04/21 1622    insulin lispro (HumaLOG) 100 units/mL subcutaneous injection 2-12 Units, 2-12 Units, Subcutaneous, TID AC, 4 Units at 09/04/21 1622 **AND** Fingerstick Glucose (POCT), , , TID AC, Josr Blank, DO    levothyroxine tablet 50 mcg, 50 mcg, Oral, Daily, Josr Blank, DO, 50 mcg at 09/04/21 0825    loratadine (CLARITIN) tablet 10 mg, 10 mg, Oral, Daily, Josr Blank, DO, 10 mg at 09/04/21 0825    metroNIDAZOLE (FLAGYL) IVPB (premix) 500 mg 100 mL, 500 mg, Intravenous, Q8H, Josr Blank, DO, Last Rate: 200 mL/hr at 09/05/21 0501, 500 mg at 09/05/21 0501    oxyCODONE (ROXICODONE) IR tablet 5 mg, 5 mg, Oral, Q4H PRN, Josr Blank, DO, 5 mg at 09/05/21 0645    pramipexole (MIRAPEX) tablet 1 5 mg, 1 5 mg, Oral, BID, Josr Blank, DO, 1 5 mg at 09/04/21 1807    propranolol (INDERAL) tablet 60 mg, 60 mg, Oral, Daily, Josr Blank, DO, 60 mg at 09/04/21 0825    tamsulosin (FLOMAX) capsule 0 4 mg, 0 4 mg, Oral, Daily With Veto Ambrosia, DO, 0 4 mg at 09/04/21 1615    vancomycin (VANCOCIN) 1500 mg in sodium chloride 0 9% 250 mL IVPB, 15 mg/kg (Adjusted), Intravenous, Q12H, Josr Blank, DO, 1,500 mg at 09/05/21 0501    Blood Culture:   Lab Results   Component Value Date    BLOODCX No Growth at 24 hrs  09/03/2021       Wound Culture:   No results found for: WOUNDCULT    Ins and Outs:  I/O last 24 hours: In: 5 [P O :420]  Out: 2900 [Urine:2900]          Physical Exam:   /60   Pulse 60   Temp 98 4 °F (36 9 °C)   Resp 19   Ht 6' 3" (1 905 m)   Wt 128 kg (281 lb 4 9 oz)   SpO2 94%   BMI 35 16 kg/m²   Gen: Alert and oriented to person, place, time  HEENT: EOMI, eyes clear, moist mucus membranes, hearing intact  Respiratory: Bilateral chest rise  No audible wheezing found  Cardiovascular: Regular Rate and Rhythm  Abdomen: soft nontender/nondistended    Musculoskeletal:  Left lower extremity   · Skin overlying Left knee without obvious signs of infection such as erythema, soft tissue swelling, effusion, warmth, rash   Previous surgical incision well healed  · Tenderness to palpation posterior thigh at hamstrings, medial hamstrings and extending mildly into pes anserinus bursa  Mild tenderness to hip abductors  No tenderness about Left knee  Well tracking patella  Compartments soft, compressible  Mild soreness in Left calf  · While sitting in recliner, range of motion of Left knee from 0 degrees extension to approximately 95 degrees flexion without pain  Mild quadriceps tone, +4/5 quad strength with resistance  Passive range of motion of Left hip mildly limited secondary to patient seating in recliner  Minimal to no pain with passive external and internal rotation  · Equivocal stinchfield, negative log roll  No micromotion tenderness  · Sensation intact L2-S1 dermatomes, though subjectively decreased about Left foot  · +2 DP pulse palpated, extremity warm and well perfused      Tertiary: no tenderness over all other joints/long bones as except already stated  Radiology:   I personally reviewed the films  No films to review at this time  Did place order for Left hip and Left knee x-rays      _*_*_*_*_*_*_*_*_*_*_*_*_*_*_*_*_*_*_*_*_*_*_*_*_*_*_*_*_*_*_*_*_*_*_*_*_*_*_*_*_*    Assessment:  76 y o male Left medial hamstring strain, possible pes anserinus bursitis; Left groin pain, x-rays to evaluate for osteoarthritis     Plan:   · Weightbearing as tolerated Left lower extremity  · PT/OT, ice/heat application to affected areas, education on hamstring stretches and at home strengthening  · Pain control per primary team  · DVT ppx per primary team  · Continue IV antibiotics as per primary team   · Dispo: At this time, no obvious signs of infection of Left total knee prosthesis  Will hold off on any aspiration of Left knee at this time  Likely hamstring strain, possible pes anserinus bursitis  WBC this morning of 4 53 and temperature of 98 7  PT/OT for hamstring strengthening and stretching education   Advised patient to apply ice/heat to affected area to his tolerance  Continue weightbearing as tolerated Left lower extremity  Will obtain Left hip x-rays to evaluate for hip osteoarthritis secondary to complaints of groin pain  Will continue to follow         Annalee Killian PA-C

## 2021-09-05 NOTE — ASSESSMENT & PLAN NOTE
No results found for: HGBA1C    Recent Labs     09/04/21  1120 09/04/21  1622 09/04/21  2042 09/05/21  0722   POCGLU 179* 216* 228* 165*       Blood Sugar Average: Last 72 hrs:  · (P) 795 [id number] hold oral antihyperglycemics  · Correctional scale insulin  · Glargine 28 units at night  · lispro 12 units with dinner  · NovoLog 70/3022 units in the morning

## 2021-09-05 NOTE — ASSESSMENT & PLAN NOTE
Lab Results   Component Value Date    EGFR 78 09/05/2021    EGFR 63 09/04/2021    EGFR 58 09/03/2021    CREATININE 0 96 09/05/2021    CREATININE 1 14 09/04/2021    CREATININE 1 22 09/03/2021     · Patient with history of horseshoe kidney  · Creatinine at baseline currently; baseline appears to be 1-1 2  · Continue to monitor

## 2021-09-05 NOTE — ASSESSMENT & PLAN NOTE
Patient does have 1/2 blood cultures positive for Gram Negative Bhupinder Enteric Like    Source for this is unclear  He does have some bronchitis but this typically would not cause Gram-negative bacteremia  Abdominal exam is benign  No clinical signs of cholecystitis  CBD dilated but chronically apparently  No cholestatic pattern    Denies any diarrhea    Continue antibiotics, currently on aztreonam vancomycin  Follow-up final results of cultures

## 2021-09-05 NOTE — PROGRESS NOTES
Vancomycin IV Pharmacy-to-Dose Consultation    Liliane Carson is a 78 y o  male who is currently receiving Vancomycin IV with management by the Pharmacy Consult service  Assessment/Plan:  The patient was reviewed  Renal function is stable and no signs or symptoms of nephrotoxicity and/or infusion reactions were documented in the chart  Based on todays assessment, continue current vancomycin (day # 3) dosing of 1500 mg IV q 12 H, with a plan for trough to be drawn at 1630 on 9/6/21  Trough 14 1, close to therapeutic to warrant same dose and another trough in 24 hours  We will continue to follow the patients culture results and clinical progress daily      Stephanie Frank, Pharmacist

## 2021-09-05 NOTE — ASSESSMENT & PLAN NOTE
· Patient stated he woke up with fever and rigors the morning of admission and did state he had an episode of diarrhea today    Patient denies eating anything out of the ordinary yesterday and denies eating any shellfish or undercooked meats  · Patient is vaccinated and no on else in the family is currently sick  · Sepsis with unknown source; patient with temp of 101 7°, respirations 22, platelets 88, and lactic acid of 2 3  · AST and ALT markedly elevated  · CT chest abdomen pelvis-mild bronchial wall thickening; no acute abnormality within abdomen or pelvis; several enhancing foci within posterior right hepatic lobe possibly representing hemangiomas; horseshoe kidney  · Chest x-ray-no acute cardiopulmonary disease  · Lactic acid 2 3  · WBC count normal    Possibilities include tracheobronchitis, infectious diarrhea/viral now resolved, or more concerning left knee prosthetic joint infection    Plan  · Continue aztreonam given cefazolin and penicillin allergy  · Continue vancomycin as well for now  · Follow-up final results of cultures  · Obtain orthopedics consultation given L knee pain previously and hx of joint infection - case discussed, they will obtain x-rays  · Monitor clinically, temperature curve, white count

## 2021-09-06 LAB
ALBUMIN SERPL BCP-MCNC: 2.8 G/DL (ref 3.5–5)
ALP SERPL-CCNC: 106 U/L (ref 46–116)
ALT SERPL W P-5'-P-CCNC: 178 U/L (ref 12–78)
ANION GAP SERPL CALCULATED.3IONS-SCNC: 9 MMOL/L (ref 4–13)
AST SERPL W P-5'-P-CCNC: 52 U/L (ref 5–45)
BACTERIA BLD CULT: ABNORMAL
BASOPHILS # BLD AUTO: 0.05 THOUSANDS/ΜL (ref 0–0.1)
BASOPHILS NFR BLD AUTO: 1 % (ref 0–1)
BILIRUB SERPL-MCNC: 0.57 MG/DL (ref 0.2–1)
BUN SERPL-MCNC: 11 MG/DL (ref 5–25)
CALCIUM ALBUM COR SERPL-MCNC: 10.3 MG/DL (ref 8.3–10.1)
CALCIUM SERPL-MCNC: 9.3 MG/DL (ref 8.3–10.1)
CHLORIDE SERPL-SCNC: 104 MMOL/L (ref 100–108)
CO2 SERPL-SCNC: 23 MMOL/L (ref 21–32)
CREAT SERPL-MCNC: 0.91 MG/DL (ref 0.6–1.3)
EOSINOPHIL # BLD AUTO: 0.72 THOUSAND/ΜL (ref 0–0.61)
EOSINOPHIL NFR BLD AUTO: 14 % (ref 0–6)
ERYTHROCYTE [DISTWIDTH] IN BLOOD BY AUTOMATED COUNT: 14.1 % (ref 11.6–15.1)
GFR SERPL CREATININE-BSD FRML MDRD: 83 ML/MIN/1.73SQ M
GLUCOSE SERPL-MCNC: 191 MG/DL (ref 65–140)
GLUCOSE SERPL-MCNC: 201 MG/DL (ref 65–140)
GLUCOSE SERPL-MCNC: 219 MG/DL (ref 65–140)
GLUCOSE SERPL-MCNC: 249 MG/DL (ref 65–140)
GLUCOSE SERPL-MCNC: 321 MG/DL (ref 65–140)
GRAM STN SPEC: ABNORMAL
HCT VFR BLD AUTO: 43.6 % (ref 36.5–49.3)
HGB BLD-MCNC: 14.2 G/DL (ref 12–17)
IMM GRANULOCYTES # BLD AUTO: 0.03 THOUSAND/UL (ref 0–0.2)
IMM GRANULOCYTES NFR BLD AUTO: 1 % (ref 0–2)
LYMPHOCYTES # BLD AUTO: 0.81 THOUSANDS/ΜL (ref 0.6–4.47)
LYMPHOCYTES NFR BLD AUTO: 15 % (ref 14–44)
MCH RBC QN AUTO: 28.1 PG (ref 26.8–34.3)
MCHC RBC AUTO-ENTMCNC: 32.6 G/DL (ref 31.4–37.4)
MCV RBC AUTO: 86 FL (ref 82–98)
MONOCYTES # BLD AUTO: 0.52 THOUSAND/ΜL (ref 0.17–1.22)
MONOCYTES NFR BLD AUTO: 10 % (ref 4–12)
NEUTROPHILS # BLD AUTO: 3.12 THOUSANDS/ΜL (ref 1.85–7.62)
NEUTS SEG NFR BLD AUTO: 59 % (ref 43–75)
NRBC BLD AUTO-RTO: 0 /100 WBCS
PLATELET # BLD AUTO: 93 THOUSANDS/UL (ref 149–390)
PMV BLD AUTO: 9.8 FL (ref 8.9–12.7)
POTASSIUM SERPL-SCNC: 4.5 MMOL/L (ref 3.5–5.3)
PROT SERPL-MCNC: 6.3 G/DL (ref 6.4–8.2)
RBC # BLD AUTO: 5.05 MILLION/UL (ref 3.88–5.62)
SODIUM SERPL-SCNC: 136 MMOL/L (ref 136–145)
WBC # BLD AUTO: 5.25 THOUSAND/UL (ref 4.31–10.16)

## 2021-09-06 PROCEDURE — 80053 COMPREHEN METABOLIC PANEL: CPT | Performed by: INTERNAL MEDICINE

## 2021-09-06 PROCEDURE — 85025 COMPLETE CBC W/AUTO DIFF WBC: CPT | Performed by: INTERNAL MEDICINE

## 2021-09-06 PROCEDURE — 82948 REAGENT STRIP/BLOOD GLUCOSE: CPT

## 2021-09-06 PROCEDURE — 87040 BLOOD CULTURE FOR BACTERIA: CPT | Performed by: INTERNAL MEDICINE

## 2021-09-06 PROCEDURE — 99232 SBSQ HOSP IP/OBS MODERATE 35: CPT | Performed by: INTERNAL MEDICINE

## 2021-09-06 RX ADMIN — LORATADINE 10 MG: 10 TABLET ORAL at 10:33

## 2021-09-06 RX ADMIN — OXYCODONE HYDROCHLORIDE 5 MG: 5 TABLET ORAL at 22:00

## 2021-09-06 RX ADMIN — LEVOTHYROXINE SODIUM 50 MCG: 50 TABLET ORAL at 10:35

## 2021-09-06 RX ADMIN — METRONIDAZOLE 500 MG: 500 INJECTION, SOLUTION INTRAVENOUS at 06:06

## 2021-09-06 RX ADMIN — HEPARIN SODIUM 5000 UNITS: 5000 INJECTION INTRAVENOUS; SUBCUTANEOUS at 06:05

## 2021-09-06 RX ADMIN — OXYCODONE HYDROCHLORIDE 5 MG: 5 TABLET ORAL at 10:39

## 2021-09-06 RX ADMIN — METRONIDAZOLE 500 MG: 500 INJECTION, SOLUTION INTRAVENOUS at 21:38

## 2021-09-06 RX ADMIN — GABAPENTIN 800 MG: 400 CAPSULE ORAL at 21:37

## 2021-09-06 RX ADMIN — DICLOFENAC SODIUM 2 G: 10 GEL TOPICAL at 22:00

## 2021-09-06 RX ADMIN — INSULIN LISPRO 4 UNITS: 100 INJECTION, SOLUTION INTRAVENOUS; SUBCUTANEOUS at 09:00

## 2021-09-06 RX ADMIN — PRAMIPEXOLE DIHYDROCHLORIDE 1.5 MG: 0.5 TABLET ORAL at 16:21

## 2021-09-06 RX ADMIN — GUAIFENESIN 600 MG: 600 TABLET ORAL at 10:39

## 2021-09-06 RX ADMIN — INSULIN ASPART 22 UNITS: 100 INJECTION, SUSPENSION SUBCUTANEOUS at 10:36

## 2021-09-06 RX ADMIN — INSULIN LISPRO 12 UNITS: 100 INJECTION, SOLUTION INTRAVENOUS; SUBCUTANEOUS at 17:46

## 2021-09-06 RX ADMIN — VANCOMYCIN HYDROCHLORIDE 1500 MG: 1 INJECTION, POWDER, LYOPHILIZED, FOR SOLUTION INTRAVENOUS at 05:22

## 2021-09-06 RX ADMIN — GABAPENTIN 800 MG: 400 CAPSULE ORAL at 16:17

## 2021-09-06 RX ADMIN — PRAMIPEXOLE DIHYDROCHLORIDE 1.5 MG: 0.5 TABLET ORAL at 10:33

## 2021-09-06 RX ADMIN — OXYCODONE HYDROCHLORIDE 5 MG: 5 TABLET ORAL at 02:10

## 2021-09-06 RX ADMIN — INSULIN LISPRO 4 UNITS: 100 INJECTION, SOLUTION INTRAVENOUS; SUBCUTANEOUS at 13:30

## 2021-09-06 RX ADMIN — INSULIN GLARGINE 28 UNITS: 100 INJECTION, SOLUTION SUBCUTANEOUS at 21:37

## 2021-09-06 RX ADMIN — ASPIRIN 81 MG CHEWABLE TABLET 81 MG: 81 TABLET CHEWABLE at 10:32

## 2021-09-06 RX ADMIN — INSULIN LISPRO 4 UNITS: 100 INJECTION, SOLUTION INTRAVENOUS; SUBCUTANEOUS at 17:45

## 2021-09-06 RX ADMIN — GABAPENTIN 800 MG: 400 CAPSULE ORAL at 10:33

## 2021-09-06 RX ADMIN — HYDROMORPHONE HYDROCHLORIDE 0.5 MG: 1 INJECTION, SOLUTION INTRAMUSCULAR; INTRAVENOUS; SUBCUTANEOUS at 03:02

## 2021-09-06 RX ADMIN — DICLOFENAC SODIUM 2 G: 10 GEL TOPICAL at 13:30

## 2021-09-06 RX ADMIN — METRONIDAZOLE 500 MG: 500 INJECTION, SOLUTION INTRAVENOUS at 13:29

## 2021-09-06 RX ADMIN — DICLOFENAC SODIUM 2 G: 10 GEL TOPICAL at 16:18

## 2021-09-06 RX ADMIN — HYDROMORPHONE HYDROCHLORIDE 0.5 MG: 1 INJECTION, SOLUTION INTRAMUSCULAR; INTRAVENOUS; SUBCUTANEOUS at 17:39

## 2021-09-06 RX ADMIN — AZTREONAM 2000 MG: 2 INJECTION, POWDER, LYOPHILIZED, FOR SOLUTION INTRAMUSCULAR; INTRAVENOUS at 16:18

## 2021-09-06 RX ADMIN — AZTREONAM 2000 MG: 2 INJECTION, POWDER, LYOPHILIZED, FOR SOLUTION INTRAMUSCULAR; INTRAVENOUS at 23:46

## 2021-09-06 RX ADMIN — GUAIFENESIN 600 MG: 600 TABLET ORAL at 21:37

## 2021-09-06 RX ADMIN — ACETAMINOPHEN 650 MG: 325 TABLET, FILM COATED ORAL at 10:34

## 2021-09-06 RX ADMIN — AZTREONAM 2000 MG: 2 INJECTION, POWDER, LYOPHILIZED, FOR SOLUTION INTRAMUSCULAR; INTRAVENOUS at 09:00

## 2021-09-06 RX ADMIN — HEPARIN SODIUM 5000 UNITS: 5000 INJECTION INTRAVENOUS; SUBCUTANEOUS at 21:37

## 2021-09-06 RX ADMIN — INSULIN LISPRO 3 UNITS: 100 INJECTION, SOLUTION INTRAVENOUS; SUBCUTANEOUS at 22:00

## 2021-09-06 RX ADMIN — PROPRANOLOL HYDROCHLORIDE 60 MG: 20 TABLET ORAL at 10:33

## 2021-09-06 RX ADMIN — TAMSULOSIN HYDROCHLORIDE 0.4 MG: 0.4 CAPSULE ORAL at 16:17

## 2021-09-06 RX ADMIN — HEPARIN SODIUM 5000 UNITS: 5000 INJECTION INTRAVENOUS; SUBCUTANEOUS at 13:30

## 2021-09-06 NOTE — ASSESSMENT & PLAN NOTE
Patient does have 1/2 blood cultures positive for Enterobacter aerogenous  Apparently this is sensitive to aztreonam     Source for this is unclear  Clinically He does have some bronchitis  Abdominal exam is benign  No clinical signs of cholecystitis  CBD dilated but chronically apparently  No cholestatic pattern  No right upper quadrant pain  Denies any diarrhea actively now    Continue aztreonam for now  Typically for Gram-negative bacteremia would not repeat blood cultures by given history of endocarditis and prosthetic joint I will obtain repeat blood cultures on him  Follow results

## 2021-09-06 NOTE — ASSESSMENT & PLAN NOTE
· Stable at this time  · Continue propranolol 60 mg daily    Of note, patient does mention some chest pain but this is not related to typical angina  Actually pain is reproducible to palpation  Likely in the setting of significant coughing has developed some costochondritis  Will apply some Voltaren gel

## 2021-09-06 NOTE — ASSESSMENT & PLAN NOTE
· Patient stated on admission that he woke up with fever and rigors the morning of admission and did state he had an episode of diarrhea today    Patient denies eating anything out of the ordinary yesterday and denies eating any shellfish or undercooked meats  · Patient is vaccinated and no on else in the family is currently sick  · Sepsis with unknown source; patient with temp of 101 7°, respirations 22, platelets 88, and lactic acid of 2 3  · AST and ALT markedly elevated  · CT chest abdomen pelvis-mild bronchial wall thickening; no acute abnormality within abdomen or pelvis; several enhancing foci within posterior right hepatic lobe possibly representing hemangiomas; horseshoe kidney  · Chest x-ray-no acute cardiopulmonary disease  · Lactic acid 2 3  · WBC count normal    Possibilities include tracheobronchitis with transient bacteremia, infectious diarrhea/viral now resolved, or more concerning left knee prosthetic joint infection - although this now appears less likely as per Orthopedics    Plan  · Continue aztreonam given cefazolin and penicillin allergy  · Given the fact that there is only Enterobacter growing - discontinue vancomycin  · Follow-up final results of cultures  · Orthopedics have evaluated patient- case discussed, x-rays done, report pending - they do not have suspicion for joint infection at this point in time  · Monitor clinically, temperature curve, white count

## 2021-09-06 NOTE — PROGRESS NOTES
[address] [facility]  Progress Note Pepe Radha 1945, 78 y o  male MRN: [id number]  Unit/Bed#: -Susan Encounter: [id number]  Primary Care Provider: Apple Arnold   Date and time admitted to hospital: 9/3/2021 11:36 AM    Gram-negative bacteremia  Assessment & Plan  Patient does have 1/2 blood cultures positive for Enterobacter aerogenous  Apparently this is sensitive to aztreonam     Source for this is unclear  Clinically He does have some bronchitis  Abdominal exam is benign  No clinical signs of cholecystitis  CBD dilated but chronically apparently  No cholestatic pattern  No right upper quadrant pain  Denies any diarrhea actively now    Continue aztreonam for now  Typically for Gram-negative bacteremia would not repeat blood cultures by given history of endocarditis and prosthetic joint I will obtain repeat blood cultures on him  Follow results  * Sepsis [facility])  Assessment & Plan  · Patient stated on admission that he woke up with fever and rigors the morning of admission and did state he had an episode of diarrhea today    Patient denies eating anything out of the ordinary yesterday and denies eating any shellfish or undercooked meats  · Patient is vaccinated and no on else in the family is currently sick  · Sepsis with unknown source; patient with temp of 101 7°, respirations 22, platelets 88, and lactic acid of 2 3  · AST and ALT markedly elevated  · CT chest abdomen pelvis-mild bronchial wall thickening; no acute abnormality within abdomen or pelvis; several enhancing foci within posterior right hepatic lobe possibly representing hemangiomas; horseshoe kidney  · Chest x-ray-no acute cardiopulmonary disease  · Lactic acid 2 3  · WBC count normal    Possibilities include tracheobronchitis with transient bacteremia, infectious diarrhea/viral now resolved, or more concerning left knee prosthetic joint infection - although this now appears less likely as per Orthopedics    Plan  · Continue aztreonam given cefazolin and penicillin allergy  · Given the fact that there is only Enterobacter growing - discontinue vancomycin  · Follow-up final results of cultures  · Orthopedics have evaluated patient- case discussed, x-rays done, report pending - they do not have suspicion for joint infection at this point in time  · Monitor clinically, temperature curve, white count    DM (diabetes mellitus), type 2 [facility])  Assessment & Plan  No results found for: HGBA1C    Recent Labs     09/05/21  0722 09/05/21  1624 09/05/21  2059 09/06/21  0710   POCGLU 165* 233* 261* 201*       Blood Sugar Average: Last 72 hrs:  · (P) 184 [id number] hold oral antihyperglycemics    · Correctional scale insulin  · Glargine 28 units at night  · lispro 12 units with dinner  · NovoLog 70/3022 units in the morning      Thrombocytopathia (HCC)  Assessment & Plan  · Platelets 88; upon chart review patient has had slight downtrend in platelets and baseline appears to be around 100  · May be secondary to sepsis  · No signs of bleeding  · Monitor    Elevated troponin  Assessment & Plan  · Mildly elevated troponin with flat curve is likely non MI troponin elevation  Patient denies any chest pain      Transaminitis  Assessment & Plan  · Noted to have elevated AST and ALT  · Hepatitis panel negative  · Right upper quadrant ultrasound reveals cholelithiasis but no cholecystitis, no CBD dilation  · Etiology may be secondary to sepsis and fatty liver; will continue monitor    Monitor CMP    Hypothyroidism  Assessment & Plan  Continue levothyroxine 50 mcg daily    Hypomagnesemia  Assessment & Plan  · Magnesium was replaced  · Currently much better    Monitor    Hyperlipidemia associated with type 2 diabetes mellitus ([facility] [address] )  Assessment & Plan  Given markedly elevated AST and ALT will hold statin at this time      Horseshoe kidney with renal calculus  Assessment & Plan  Plan is under CKD    History of endocarditis  Assessment & Plan  · 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)  Assessment & Plan  Lab Results   Component Value Date    EGFR 83 09/06/2021    EGFR 78 09/05/2021    EGFR 63 09/04/2021    CREATININE 0 91 09/06/2021    CREATININE 0 96 09/05/2021    CREATININE 1 14 09/04/2021     · Patient with history of horseshoe kidney  · Creatinine at baseline currently; baseline appears to be 1-1 2  · Continue to monitor    Benign prostatic hyperplasia without lower urinary tract symptoms  Assessment & Plan  Continue tamsulosin    CAD (coronary artery disease)  Assessment & Plan  · Stable at this time  · Continue propranolol 60 mg daily    Of note, patient does mention some chest pain but this is not related to typical angina  Actually pain is reproducible to palpation  Likely in the setting of significant coughing has developed some costochondritis  Will apply some Voltaren gel  VTE Pharmacologic Prophylaxis:   Pharmacologic: Heparin  Mechanical VTE Prophylaxis in Place: Yes    Patient Centered Rounds: I have performed bedside rounds with nursing staff today  Discussions with Specialists or Other Care Team Provider:  Discussed with care management team    Education and Discussions with Family / Patient:  Discussed with patient's family over the phone    Time Spent for Care: 30 minutes  More than 50% of total time spent on counseling and coordination of care as described above      Current Length of Stay: 3 day(s)    Current Patient Status: Inpatient   Certification Statement: The patient will continue to require additional inpatient hospital stay due to Need for IV antibiotics    Discharge Plan:  Once stable    Code Status: Level 3 - DNAR and DNI      Subjective:     Patient evaluated this morning  Afebrile, nontoxic  Feeling okay except for chest wall pain with deep inspiration  Still has some productive cough  Denies nausea vomiting otherwise, diarrhea constipation  No other events reported    Objective:     Vitals:   Temp (24hrs), Av 9 °F (36 6 °C), Min:97 5 °F (36 4 °C), Max:98 2 °F (36 8 °C)    Temp:  [97 5 °F (36 4 °C)-98 2 °F (36 8 °C)] 97 5 °F (36 4 °C)  HR:  [59-61] 59  Resp:  [18] 18  BP: (124-150)/(71-82) 150/82  SpO2:  [96 %-97 %] 96 %  Body mass index is 35 16 kg/m²  Input and Output Summary (last 24 hours): Intake/Output Summary (Last 24 hours) at 2021 1124  Last data filed at 2021 0900  Gross per 24 hour   Intake 240 ml   Output 2450 ml   Net -2210 ml       Physical Exam:     Physical Exam  Vitals and nursing note reviewed  Constitutional:       Appearance: Normal appearance  Comments: Elderly male in bed, awake   HENT:      Head: Normocephalic and atraumatic  Right Ear: External ear normal       Left Ear: External ear normal       Nose: Nose normal  No congestion or rhinorrhea  Mouth/Throat:      Mouth: Mucous membranes are dry  Pharynx: Oropharynx is clear  No oropharyngeal exudate or posterior oropharyngeal erythema  Eyes:      General: No scleral icterus  Right eye: No discharge  Left eye: No discharge  Pupils: Pupils are equal, round, and reactive to light  Neck:      Vascular: No carotid bruit  Cardiovascular:      Rate and Rhythm: Normal rate and regular rhythm  Pulses: Normal pulses  Heart sounds: No murmur heard  No friction rub  No gallop  Pulmonary:      Effort: Pulmonary effort is normal  No respiratory distress  Breath sounds: Normal breath sounds  No stridor  No wheezing, rhonchi or rales  Abdominal:      General: Abdomen is flat  Bowel sounds are normal  There is no distension  Palpations: Abdomen is soft  There is no mass  Tenderness:  There is no abdominal tenderness  There is no guarding or rebound  Hernia: No hernia is present  Musculoskeletal:         General: No swelling, tenderness, deformity or signs of injury  Normal range of motion  Cervical back: Normal range of motion  No rigidity  No muscular tenderness  Lymphadenopathy:      Cervical: No cervical adenopathy  Skin:     General: Skin is warm and dry  Capillary Refill: Capillary refill takes less than 2 seconds  Coloration: Skin is not jaundiced or pale  Findings: No bruising or erythema  Neurological:      General: No focal deficit present  Mental Status: He is alert and oriented to person, place, and time  Mental status is at baseline  Cranial Nerves: No cranial nerve deficit  Sensory: No sensory deficit  Motor: No weakness  Coordination: Coordination normal       Deep Tendon Reflexes: Reflexes normal    Psychiatric:         Mood and Affect: Mood normal          Behavior: Behavior normal          Thought Content:  Thought content normal          Judgment: Judgment normal            Additional Data:     Labs:    Results from last 7 days   Lab Units 09/06/21  0610   WBC Thousand/uL 5 25   HEMOGLOBIN g/dL 14 2   HEMATOCRIT % 43 6   PLATELETS Thousands/uL 93*   NEUTROS PCT % 59   LYMPHS PCT % 15   MONOS PCT % 10   EOS PCT % 14*     Results from last 7 days   Lab Units 09/06/21  0609   SODIUM mmol/L 136   POTASSIUM mmol/L 4 5   CHLORIDE mmol/L 104   CO2 mmol/L 23   BUN mg/dL 11   CREATININE mg/dL 0 91   ANION GAP mmol/L 9   CALCIUM mg/dL 9 3   ALBUMIN g/dL 2 8*   TOTAL BILIRUBIN mg/dL 0 57   ALK PHOS U/L 106   ALT U/L 178*   AST U/L 52*   GLUCOSE RANDOM mg/dL 191*     Results from last 7 days   Lab Units 09/03/21  1659   INR  1 18     Results from last 7 days   Lab Units 09/06/21  0710 09/05/21  2059 09/05/21  1624 09/05/21  0722 09/04/21  2042 09/04/21  1622 09/04/21  1120 09/04/21  0714 09/03/21  2133   POC GLUCOSE mg/dl 201* 261* 233* 165* 228* 216* 179* 144* 140         Results from last 7 days   Lab Units 09/04/21  0451 09/03/21  1659 09/03/21  1338 09/03/21  1149   LACTIC ACID mmol/L  --  1 9 2 3* 2 2*   PROCALCITONIN ng/ml 0 91* 1 39*  --   --            * I Have Reviewed All Lab Data Listed Above  * Additional Pertinent Lab Tests Reviewed: Maria A Egan Admission Reviewed      Recent Cultures (last 7 days):     Results from last 7 days   Lab Units 09/03/21  1338 09/03/21  1149   BLOOD CULTURE  No Growth at 48 hrs   Enterobacter aerogenes*   GRAM STAIN RESULT   --  Gram negative rods*       Last 24 Hours Medication List:   Current Facility-Administered Medications   Medication Dose Route Frequency Provider Last Rate    acetaminophen  650 mg Oral Q6H PRN Breanna Herbert, DO      aspirin  81 mg Oral Daily Breanna Herbert, DO      aztreonam  2,000 mg Intravenous Q8H Breanna Herbert, DO 2,000 mg (09/06/21 0900)    Diclofenac Sodium  2 g Topical 4x Daily Penny Rico MD      gabapentin  800 mg Oral TID Breanna Herbert, DO      guaiFENesin  600 mg Oral Q12H [facility] Penny Rico MD      heparin (porcine)  5,000 Units Subcutaneous [facility] Breanna Herbert, [place]      HYDROmorphone  0 5 mg Intravenous Q6H PRN Penny Rico MD      insulin aspart protamine-insulin aspart  22 Units Subcutaneous Daily Breanna Herbert, DO      insulin glargine  28 Units Subcutaneous HS Breanna Herbert, DO      insulin lispro  1-5 Units Subcutaneous HS Breanna Herbert, DO      insulin lispro  12 Units Subcutaneous Daily With Colby Chase, DO      insulin lispro  2-12 Units Subcutaneous TID [facility] Breannaaidan Herbert, DO      levothyroxine  50 mcg Oral Daily Breanna Herbert, DO      loratadine  10 mg Oral Daily Breannaaidan Herbert, DO      metroNIDAZOLE  500 mg Intravenous Q8H Breanna Herbert,  mg (09/06/21 0606)    oxyCODONE  5 mg Oral Q4H PRN Breanna Herbert, DO      pramipexole  1 5 mg Oral BID Lillian Darling DO [place]      propranolol  60 mg Oral Daily Angela Mccall DO      tamsulosin  0 4 mg Oral Daily With Raysa Carreon DO          Today, Patient Was Seen By: Anthony Ennis MD    ** Please Note: Dictation voice to text software may have been used in the creation of this document   **

## 2021-09-06 NOTE — ASSESSMENT & PLAN NOTE
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

## 2021-09-06 NOTE — ASSESSMENT & PLAN NOTE
Lab Results   Component Value Date    EGFR 83 09/06/2021    EGFR 78 09/05/2021    EGFR 63 09/04/2021    CREATININE 0 91 09/06/2021    CREATININE 0 96 09/05/2021    CREATININE 1 14 09/04/2021     · Patient with history of horseshoe kidney  · Creatinine at baseline currently; baseline appears to be 1-1 2  · Continue to monitor

## 2021-09-06 NOTE — ASSESSMENT & PLAN NOTE
No results found for: HGBA1C    Recent Labs     09/05/21  0722 09/05/21  1624 09/05/21  2059 09/06/21  0710   POCGLU 165* 233* 261* 201*       Blood Sugar Average: Last 72 hrs:  · (P) 947 [id number] hold oral antihyperglycemics    · Correctional scale insulin  · Glargine 28 units at night  · lispro 12 units with dinner  · NovoLog 70/3022 units in the morning

## 2021-09-07 ENCOUNTER — APPOINTMENT (INPATIENT)
Dept: MRI IMAGING | Facility: HOSPITAL | Age: 76
DRG: 872 | End: 2021-09-07
Payer: MEDICARE

## 2021-09-07 ENCOUNTER — APPOINTMENT (INPATIENT)
Dept: NON INVASIVE DIAGNOSTICS | Facility: HOSPITAL | Age: 76
DRG: 872 | End: 2021-09-07
Payer: MEDICARE

## 2021-09-07 LAB
ALBUMIN SERPL BCP-MCNC: 2.9 G/DL (ref 3.5–5)
ALP SERPL-CCNC: 105 U/L (ref 46–116)
ALT SERPL W P-5'-P-CCNC: 141 U/L (ref 12–78)
ANION GAP SERPL CALCULATED.3IONS-SCNC: 8 MMOL/L (ref 4–13)
AST SERPL W P-5'-P-CCNC: 33 U/L (ref 5–45)
ATRIAL RATE: 81 BPM
ATRIAL RATE: 82 BPM
B BURGDOR IGG+IGM SER-ACNC: 10
BASOPHILS # BLD AUTO: 0.04 THOUSANDS/ΜL (ref 0–0.1)
BASOPHILS NFR BLD AUTO: 1 % (ref 0–1)
BILIRUB SERPL-MCNC: 0.45 MG/DL (ref 0.2–1)
BUN SERPL-MCNC: 12 MG/DL (ref 5–25)
CALCIUM ALBUM COR SERPL-MCNC: 9.9 MG/DL (ref 8.3–10.1)
CALCIUM SERPL-MCNC: 9 MG/DL (ref 8.3–10.1)
CHLORIDE SERPL-SCNC: 105 MMOL/L (ref 100–108)
CO2 SERPL-SCNC: 24 MMOL/L (ref 21–32)
CREAT SERPL-MCNC: 0.94 MG/DL (ref 0.6–1.3)
EOSINOPHIL # BLD AUTO: 0.68 THOUSAND/ΜL (ref 0–0.61)
EOSINOPHIL NFR BLD AUTO: 14 % (ref 0–6)
ERYTHROCYTE [DISTWIDTH] IN BLOOD BY AUTOMATED COUNT: 14 % (ref 11.6–15.1)
GFR SERPL CREATININE-BSD FRML MDRD: 80 ML/MIN/1.73SQ M
GLUCOSE SERPL-MCNC: 146 MG/DL (ref 65–140)
GLUCOSE SERPL-MCNC: 195 MG/DL (ref 65–140)
GLUCOSE SERPL-MCNC: 203 MG/DL (ref 65–140)
GLUCOSE SERPL-MCNC: 263 MG/DL (ref 65–140)
GLUCOSE SERPL-MCNC: 265 MG/DL (ref 65–140)
HCT VFR BLD AUTO: 45 % (ref 36.5–49.3)
HGB BLD-MCNC: 14.9 G/DL (ref 12–17)
IMM GRANULOCYTES # BLD AUTO: 0.03 THOUSAND/UL (ref 0–0.2)
IMM GRANULOCYTES NFR BLD AUTO: 1 % (ref 0–2)
LYMPHOCYTES # BLD AUTO: 0.89 THOUSANDS/ΜL (ref 0.6–4.47)
LYMPHOCYTES NFR BLD AUTO: 18 % (ref 14–44)
MCH RBC QN AUTO: 28.1 PG (ref 26.8–34.3)
MCHC RBC AUTO-ENTMCNC: 33.1 G/DL (ref 31.4–37.4)
MCV RBC AUTO: 85 FL (ref 82–98)
MONOCYTES # BLD AUTO: 0.43 THOUSAND/ΜL (ref 0.17–1.22)
MONOCYTES NFR BLD AUTO: 9 % (ref 4–12)
NEUTROPHILS # BLD AUTO: 2.78 THOUSANDS/ΜL (ref 1.85–7.62)
NEUTS SEG NFR BLD AUTO: 57 % (ref 43–75)
NRBC BLD AUTO-RTO: 0 /100 WBCS
P AXIS: 37 DEGREES
P AXIS: 45 DEGREES
PLATELET # BLD AUTO: 103 THOUSANDS/UL (ref 149–390)
PMV BLD AUTO: 9.6 FL (ref 8.9–12.7)
POTASSIUM SERPL-SCNC: 4.1 MMOL/L (ref 3.5–5.3)
PR INTERVAL: 272 MS
PR INTERVAL: 306 MS
PROT SERPL-MCNC: 6.5 G/DL (ref 6.4–8.2)
QRS AXIS: 1 DEGREES
QRS AXIS: 5 DEGREES
QRSD INTERVAL: 96 MS
QRSD INTERVAL: 98 MS
QT INTERVAL: 360 MS
QT INTERVAL: 366 MS
QTC INTERVAL: 420 MS
QTC INTERVAL: 425 MS
RBC # BLD AUTO: 5.31 MILLION/UL (ref 3.88–5.62)
SODIUM SERPL-SCNC: 137 MMOL/L (ref 136–145)
T WAVE AXIS: 46 DEGREES
T WAVE AXIS: 52 DEGREES
VENTRICULAR RATE: 81 BPM
VENTRICULAR RATE: 82 BPM
WBC # BLD AUTO: 4.85 THOUSAND/UL (ref 4.31–10.16)

## 2021-09-07 PROCEDURE — 99223 1ST HOSP IP/OBS HIGH 75: CPT | Performed by: INTERNAL MEDICINE

## 2021-09-07 PROCEDURE — G1004 CDSM NDSC: HCPCS

## 2021-09-07 PROCEDURE — 93010 ELECTROCARDIOGRAM REPORT: CPT | Performed by: INTERNAL MEDICINE

## 2021-09-07 PROCEDURE — 85025 COMPLETE CBC W/AUTO DIFF WBC: CPT | Performed by: INTERNAL MEDICINE

## 2021-09-07 PROCEDURE — 80053 COMPREHEN METABOLIC PANEL: CPT | Performed by: INTERNAL MEDICINE

## 2021-09-07 PROCEDURE — 93306 TTE W/DOPPLER COMPLETE: CPT

## 2021-09-07 PROCEDURE — A9585 GADOBUTROL INJECTION: HCPCS | Performed by: PHYSICIAN ASSISTANT

## 2021-09-07 PROCEDURE — 74183 MRI ABD W/O CNTR FLWD CNTR: CPT

## 2021-09-07 PROCEDURE — 99232 SBSQ HOSP IP/OBS MODERATE 35: CPT | Performed by: INTERNAL MEDICINE

## 2021-09-07 PROCEDURE — 82948 REAGENT STRIP/BLOOD GLUCOSE: CPT

## 2021-09-07 RX ORDER — LORAZEPAM 2 MG/ML
1 INJECTION INTRAMUSCULAR ONCE
Status: COMPLETED | OUTPATIENT
Start: 2021-09-07 | End: 2021-09-07

## 2021-09-07 RX ADMIN — LORATADINE 10 MG: 10 TABLET ORAL at 08:32

## 2021-09-07 RX ADMIN — DICLOFENAC SODIUM 2 G: 10 GEL TOPICAL at 12:51

## 2021-09-07 RX ADMIN — OXYCODONE HYDROCHLORIDE 5 MG: 5 TABLET ORAL at 08:42

## 2021-09-07 RX ADMIN — HYDROMORPHONE HYDROCHLORIDE 0.5 MG: 1 INJECTION, SOLUTION INTRAMUSCULAR; INTRAVENOUS; SUBCUTANEOUS at 00:45

## 2021-09-07 RX ADMIN — PROPRANOLOL HYDROCHLORIDE 60 MG: 20 TABLET ORAL at 08:32

## 2021-09-07 RX ADMIN — AZTREONAM 2000 MG: 2 INJECTION, POWDER, LYOPHILIZED, FOR SOLUTION INTRAMUSCULAR; INTRAVENOUS at 06:44

## 2021-09-07 RX ADMIN — LORAZEPAM 1 MG: 2 INJECTION INTRAMUSCULAR; INTRAVENOUS at 18:44

## 2021-09-07 RX ADMIN — METRONIDAZOLE 500 MG: 500 INJECTION, SOLUTION INTRAVENOUS at 15:08

## 2021-09-07 RX ADMIN — METRONIDAZOLE 500 MG: 500 INJECTION, SOLUTION INTRAVENOUS at 05:14

## 2021-09-07 RX ADMIN — OXYCODONE HYDROCHLORIDE 5 MG: 5 TABLET ORAL at 15:12

## 2021-09-07 RX ADMIN — INSULIN LISPRO 6 UNITS: 100 INJECTION, SOLUTION INTRAVENOUS; SUBCUTANEOUS at 16:50

## 2021-09-07 RX ADMIN — METRONIDAZOLE 500 MG: 500 INJECTION, SOLUTION INTRAVENOUS at 21:41

## 2021-09-07 RX ADMIN — TAMSULOSIN HYDROCHLORIDE 0.4 MG: 0.4 CAPSULE ORAL at 16:34

## 2021-09-07 RX ADMIN — HEPARIN SODIUM 5000 UNITS: 5000 INJECTION INTRAVENOUS; SUBCUTANEOUS at 21:41

## 2021-09-07 RX ADMIN — GABAPENTIN 800 MG: 400 CAPSULE ORAL at 21:40

## 2021-09-07 RX ADMIN — LEVOTHYROXINE SODIUM 50 MCG: 50 TABLET ORAL at 08:32

## 2021-09-07 RX ADMIN — INSULIN LISPRO 6 UNITS: 100 INJECTION, SOLUTION INTRAVENOUS; SUBCUTANEOUS at 12:15

## 2021-09-07 RX ADMIN — PRAMIPEXOLE DIHYDROCHLORIDE 1.5 MG: 0.5 TABLET ORAL at 08:42

## 2021-09-07 RX ADMIN — INSULIN LISPRO 12 UNITS: 100 INJECTION, SOLUTION INTRAVENOUS; SUBCUTANEOUS at 16:50

## 2021-09-07 RX ADMIN — GADOBUTROL 12 ML: 604.72 INJECTION INTRAVENOUS at 20:14

## 2021-09-07 RX ADMIN — DICLOFENAC SODIUM 2 G: 10 GEL TOPICAL at 21:46

## 2021-09-07 RX ADMIN — GUAIFENESIN 600 MG: 600 TABLET ORAL at 08:32

## 2021-09-07 RX ADMIN — GABAPENTIN 800 MG: 400 CAPSULE ORAL at 16:34

## 2021-09-07 RX ADMIN — DICLOFENAC SODIUM 2 G: 10 GEL TOPICAL at 18:25

## 2021-09-07 RX ADMIN — DICLOFENAC SODIUM 2 G: 10 GEL TOPICAL at 08:33

## 2021-09-07 RX ADMIN — INSULIN GLARGINE 28 UNITS: 100 INJECTION, SOLUTION SUBCUTANEOUS at 21:42

## 2021-09-07 RX ADMIN — LEVOFLOXACIN 750 MG: 500 TABLET, FILM COATED ORAL at 12:51

## 2021-09-07 RX ADMIN — INSULIN LISPRO 4 UNITS: 100 INJECTION, SOLUTION INTRAVENOUS; SUBCUTANEOUS at 08:31

## 2021-09-07 RX ADMIN — INSULIN ASPART 22 UNITS: 100 INJECTION, SUSPENSION SUBCUTANEOUS at 08:31

## 2021-09-07 RX ADMIN — HEPARIN SODIUM 5000 UNITS: 5000 INJECTION INTRAVENOUS; SUBCUTANEOUS at 14:00

## 2021-09-07 RX ADMIN — HEPARIN SODIUM 5000 UNITS: 5000 INJECTION INTRAVENOUS; SUBCUTANEOUS at 05:14

## 2021-09-07 RX ADMIN — GABAPENTIN 800 MG: 400 CAPSULE ORAL at 08:33

## 2021-09-07 RX ADMIN — ASPIRIN 81 MG CHEWABLE TABLET 81 MG: 81 TABLET CHEWABLE at 08:32

## 2021-09-07 RX ADMIN — GUAIFENESIN 600 MG: 600 TABLET ORAL at 21:41

## 2021-09-07 NOTE — ASSESSMENT & PLAN NOTE
· Patient stated on admission that he woke up with fever and rigors the morning of admission and did state he had an episode of diarrhea today    Patient denies eating anything out of the ordinary yesterday and denies eating any shellfish or undercooked meats  · CT chest abdomen pelvis-mild bronchial wall thickening; no acute abnormality within abdomen or pelvis; several enhancing foci within posterior right hepatic lobe possibly representing hemangiomas; horseshoe kidney  · Chest x-ray-no acute cardiopulmonary disease  · Lactic acid 2 3  · WBC count normal    Possibilities include tracheobronchitis with transient bacteremia, infectious diarrhea/viral now resolved, or more concerning left knee prosthetic joint infection - although this now appears less likely as per Orthopedics    Plan  · Continue aztreonam given cefazolin and penicillin allergy  · Given the fact that there is only Enterobacter growing - discontinue vancomycin  · Follow-up final results of cultures  · Orthopedics have evaluated patient- case discussed, x-rays done, report pending - they do not have suspicion for joint infection at this point in time  · Monitor clinically, temperature curve, white count

## 2021-09-07 NOTE — ASSESSMENT & PLAN NOTE
No results found for: HGBA1C    Recent Labs     09/06/21  1116 09/06/21  1647 09/06/21  2158 09/07/21  0751   POCGLU 219* 249* 321* 203*       Blood Sugar Average: Last 72 hrs:  · (P) 218 25 hold oral antihyperglycemics    · Correctional scale insulin  · Glargine 28 units at night  · lispro 12 units with dinner  · NovoLog 70/3022 units in the morning

## 2021-09-07 NOTE — ASSESSMENT & PLAN NOTE
Patient does have 1/2 blood cultures positive for Enterobacter aerogenous  Apparently this is sensitive to aztreonam     Source for this is unclear  Clinically He does have some bronchitis  Abdominal exam is benign  No clinical signs of cholecystitis  CBD dilated but chronically apparently  No cholestatic pattern  No right upper quadrant pain  Denies any diarrhea actively now    Continue aztreonam for now      Unclear etiology  Will consult ID for further assistance

## 2021-09-07 NOTE — CONSULTS
Consultation - [address] [facility]  Virginie Castro 78 y o  male MRN: [id number]  Unit/Bed#: -01 Encounter: [id number]        Consults    Reason for Consult / Principal Problem: Bacteremia, Elevated LFTs    HPI: Danika King is a 77 yo M with a PMH of hypothyroidism, DM2, endocarditis, bioprosthetic aortic valve replacement in 2013, who presented on 9/3 with fever, rigors, and one episode of loose stool meeting sepsis criteria and found to have GN bacteremia  GI is consulted due to concern for a biliary source of his bacteremia  He reports intermittent RUQ discomfort as well as LUQ discomfort prior to discharge which is not associated with eating  He denies nausea, vomiting, or jaundice prior to admission  He has no history of liver disease or gallstones to his knowledge  He reports having normal LFTs when getting routine bloodwork in the past  He denies any further loose stool since the isolate episode prior to admission  He denies melena or hematochezia  Currently he is feeling well except for vague RUQ pain which he reports is exacerbated by coughing  He is tolerating PO well  He denies any recent tick bites or rashes  REVIEW OF SYSTEMS: Negative except for as stated above    Historical Information   Past Medical History:   Diagnosis Date    Arthritis     [facility]     Disease of thyroid gland     Hypertension     MI (myocardial infarction) [facility]     Renal disorder      Past Surgical History:   Procedure Laterality Date    BACK SURGERY      CARDIAC SURGERY      JOINT REPLACEMENT       Social History   Social History     Substance and Sexual Activity   Alcohol Use Never     Social History     Substance and Sexual Activity   Drug Use Never     Social History     Tobacco Use   Smoking Status Never Smoker   Smokeless Tobacco Current User     History reviewed  No pertinent family history      Meds/Allergies     Medications Prior to Admission   Medication    Alogliptin Benzoate 25 MG TABS    aspirin 81 mg chewable tablet    cholecalciferol (VITAMIN D3) 1,000 units tablet    gabapentin (NEURONTIN) 800 mg tablet    insulin aspart (NovoLOG) 100 units/mL injection    insulin aspart protamine-insulin aspart (NovoLOG 70/30) 100 units/mL injection    insulin glargine (LANTUS) 100 units/mL subcutaneous injection    levothyroxine 50 mcg tablet    loratadine (CLARITIN) 10 mg tablet    meloxicam (MOBIC) 7 5 mg tablet    metFORMIN (GLUCOPHAGE) 500 mg tablet    pramipexole (MIRAPEX) 1 5 MG tablet    pravastatin (PRAVACHOL) 40 mg tablet    propranolol (INDERAL) 60 mg tablet    sildenafil (VIAGRA) 100 mg tablet    tamsulosin (FLOMAX) 0 4 mg    TiZANidine (ZANAFLEX) 4 MG capsule    traMADol (ULTRAM) 50 mg tablet    vitamin B-12 (VITAMIN B-12) 1,000 mcg tablet     Current Facility-Administered Medications   Medication Dose Route Frequency    acetaminophen (TYLENOL) tablet 650 mg  650 mg Oral Q6H PRN    aspirin chewable tablet 81 mg  81 mg Oral Daily    Diclofenac Sodium (VOLTAREN) 1 % topical gel 2 g  2 g Topical 4x Daily    gabapentin (NEURONTIN) capsule 800 mg  800 mg Oral TID    guaiFENesin (MUCINEX) 12 hr tablet 600 mg  600 mg Oral Q12H JESSENIA    heparin (porcine) subcutaneous injection 5,000 Units  5,000 Units Subcutaneous Q8H [address]    HYDROmorphone (DILAUDID) injection 0 5 mg  0 5 mg Intravenous Q6H PRN    insulin aspart protamine-insulin aspart (NovoLOG 70/30) 100 units/mL subcutaneous injection 22 Units  22 Units Subcutaneous Daily    insulin glargine (LANTUS) subcutaneous injection 28 Units 0 28 mL  28 Units Subcutaneous HS    insulin lispro (HumaLOG) 100 units/mL subcutaneous injection 1-5 Units  1-5 Units Subcutaneous HS    insulin lispro (HumaLOG) 100 units/mL subcutaneous injection 12 Units  12 Units Subcutaneous Daily With Dinner    insulin lispro (HumaLOG) 100 units/mL subcutaneous injection 2-12 Units  2-12 Units Subcutaneous TID AC    levofloxacin (LEVAQUIN) tablet 750 mg  750 mg Oral Q24H    levothyroxine tablet 50 mcg  50 mcg Oral Daily    loratadine (CLARITIN) tablet 10 mg  10 mg Oral Daily    metroNIDAZOLE (FLAGYL) IVPB (premix) 500 mg 100 mL  500 mg Intravenous Q8H    oxyCODONE (ROXICODONE) IR tablet 5 mg  5 mg Oral Q4H PRN    pramipexole (MIRAPEX) tablet 1 5 mg  1 5 mg Oral BID    propranolol (INDERAL) tablet 60 mg  60 mg Oral Daily    tamsulosin (FLOMAX) capsule 0 4 mg  0 4 mg Oral Daily With Dinner       Allergies   Allergen Reactions    Cefazolin Anaphylaxis    Penicillins Anaphylaxis    Accupril [Quinapril Hcl] Other (See Comments)     unknown    Quinapril Other (See Comments)     unknown           Objective     Blood pressure 161/78, pulse 68, temperature (!) 97 4 °F (36 3 °C), resp  rate 18, height 6' 3" (1 905 m), weight 128 kg (281 lb 4 9 oz), SpO2 94 %  Intake/Output Summary (Last 24 hours) at 9/7/2021 1342  Last data filed at 9/7/2021 0644  Gross per 24 hour   Intake --   Output 1750 ml   Net -1750 ml         PHYSICAL EXAM:      General Appearance:   Alert, oriented x3, nontoxic appearing   HEENT:   Normocephalic, atraumatic, anicteric      Neck:  Supple, symmetrical, trachea midline   Lungs:   Clear to auscultation bilaterally; no rales, rhonchi or wheezing; respirations unlabored    Heart[de-identified]   S1 and S2 normal; regular rate and rhythm; no murmur, rub, or gallop     Abdomen:   Obese, soft, BS active, non-distended, (+) mild RUQ tenderness to deep palpation   Rectal:   Deferred    Extremities:  No cyanosis, clubbing or edema    Pulses:  2+ and symmetric all extremities    Skin:  No jaundice or pallor, no rashes or lesions      Lab Results:   Results from last 7 days   Lab Units 09/07/21  0506   WBC Thousand/uL 4 85   HEMOGLOBIN g/dL 14 9   HEMATOCRIT % 45 0   PLATELETS Thousands/uL 103*   NEUTROS PCT % 57   LYMPHS PCT % 18   MONOS PCT % 9   EOS PCT % 14*     Results from last 7 days   Lab Units 09/07/21  0506   POTASSIUM mmol/L 4 1   CHLORIDE mmol/L 105   CO2 mmol/L 24   BUN mg/dL 12   CREATININE mg/dL 0 94   CALCIUM mg/dL 9 0   ALK PHOS U/L 105   ALT U/L 141*   AST U/L 33     Results from last 7 days   Lab Units 09/03/21  1659   INR  1 18           Imaging Studies: I have personally reviewed pertinent imaging studies  XR chest 1 view portable  Result Date: 9/3/2021  Impression: Status post cardiac surgery No acute cardiopulmonary disease  Workstation performed: [id number]     XR hip/pelv 2-3 vws left if performed  Result Date: 9/7/2021  Impression: No acute osseous abnormality  Workstation performed: [id number]     XR knee 1 or 2 vw left  Result Date: 9/7/2021  Impression: No acute osseous abnormality  Workstation performed: [id number]     CT chest abdomen pelvis w contrast  Result Date: 9/3/2021  Impression: 1  Mild bronchial wall thickening, which may related to small airwas inflammation  Right basilar subsegmental atelectasis also present  2   No acute abnormality within the abdomen or pelvis  3   Several enhancing foci within the posterior right hepatic lobe, possibly representing hemangiomas  Further characterization with nonemergent liver MRI recommended  4   Horseshoe kidney  The study was marked in [facility] for immediate notification  Workstation performed: [id number]     US right upper quadrant with liver dopplers  Result Date: 9/4/2021  Impression: Hepatomegaly with fatty infiltration  Normal evaluation of the hepatic vasculature  Cholelithiasis with no evidence of acute cholecystitis  Prominent common bile duct, however no obstructing lesions or intrahepatic biliary dilatation identified  If clinical concern remains, consider MRCP for further evaluation   Workstation performed: [id number]       ASSESSMENT and PLAN:      Enterobacter Bacteremia  RUQ Pain  Elevated LFTs  Dilated CBD  Cholelithiasis  - He presented with fever, rigors, isolated episode of loose stool, and intermittent RUQ and LUQ discomforts, found to have an enterobacter bacteremia and elevated LFTs on admission  - LFTs elevated primarily in a hepatocellular pattern on admission and have almost completely normalized; this could have been related to passage of a gallstone causing cholangitis   - He does not have typical symptoms of biliary colic but given his bacteremia, cholelithiasis on US, and dilated CBD, we will plan for MRCP to rule out choledocholithiasis though he may have already passed a stone  - Will also plan for MRI abdomen w wo contrast to further evaluate the hepatic foci noted on CT and rule out abscess or other concerning lesions  - Continue abx per ID recommendations  - Continue to trend LFTs  - He has chronic thrombocytopenia since at least 2018 so this is unlikely related to his acute infection  - Diet as tolerated  - Supportive care        The patient will be seen and examined by Dr Ash Clark

## 2021-09-07 NOTE — CONSULTS
Consultation - Infectious Disease   Cl Pires 78 y o  male MRN: [id number]  Unit/Bed#: -01 Encounter: [id number]      IMPRESSION & RECOMMENDATIONS:   1  Sepsis  POA with fever, tachypnea, thrombocytopenia and lactic acidosis with marked elevated transaminase levels  Admission blood cultures grew 1 of 2 sets enterobacter aerogenes  Patient had generalized abdominal discomfort on admission and 1 episode of diarrhea that day  Right upper quadrant ultrasound showed some CBD prominence  LFTs are trending down and patient is clinically improving  Consider biliary source of #1/2   -discontinue Aztreonam  -start Levaquin 750 mg PO daily   -follow-up repeat blood cultures and adjust antibiotics as needed  -monitor temperature and hemodynamics  -serial exam  -recheck CBC and CMP in a m   -recommend GI consult as below     2  Enterobacter aerogenes bacteremia  Admission blood cultures grew 1 of 2 sets enterobacter aerogenes  Patient had generalized abdominal discomfort on admission and 1 episode of diarrhea that day  Right upper quadrant ultrasound showed some CBD prominence  LFTs are trending down and patient is clinically improving  Consider biliary source of #1/2   -antibiotics as above  -monitor temperature and hemodynamics  -follow-up repeat blood cultures     3  Transaminitis  POA  Patient had generalized abdominal discomfort on admission and 1 episode of diarrhea that day  Right upper quadrant ultrasound showed some CBD prominence  LFTs are trending down and patient is clinically improving  Consider biliary source of #1/2  Possible passed stone    -recommend GI consult  -consider MRCP  -trend LFTs     4  PCN/Cefazolin Allergies  Patient tolerated desensitization to penicillin in 2013 for treatment of Enterococcus bacteremia/endocarditis  Patient's wife relays that he had cefazolin within the last few years and developed an a shortness of breath, cold sweats and rash reaction    Patient tolerated 1 dose of Levaquin in the ER and a Levaquin 1 week course in August 2021  EKG Qtc 399  -switch to Levaquin  -monitor closely for tolerance   -potential rare side effects reviewed and patient agreeable to proceed with Levaquin    5  Prior enterococcus left TKR infection, 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 2013    -antibiotics as above  -monitor temperature and hemodynamics  -follow-up repeat blood cultures    Antibiotics:  Aztreonam     I have discussed the above management plan in detail with patient and wife at bedside, RN, and the primary service  Extensive review of the medical records in epic including review of the notes, radiographs, and laboratory results     HISTORY OF PRESENT ILLNESS:  Reason for Consult:  Gram-negative bacteremia    HPI: Francis Saucedo is a 78y o  year old male with 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, hyperlipidemia, horseshoe kidney, CAD, CKD stage 3, BPH, hypothyroidism, T2DM, recent Pneumonia txt course with levaquin through 8/7/21 who presented to the ER 9/3/21 with fever  Patient states he woke up that morning and had fever with T-max of a 105° per patient wife with associated rigors and diarrhea x 1  In the ER, he presented with fever, tachypnea, lactic acidosis, and thrombocytopenia as well as elevated transaminase levels  He had been to a elementary school reunion in [place] the night before  Patient has not had anything out of the ordinary in regards to diet denies any raw shellfish or undercooked meats at this time  admission blood cultures were obtained and the patient was given a dose of Levaquin  He was then admitted on a stranding and Flagyl  A right upper quadrant ultrasound showed some prominence is in his common bile duct and gallstones  Infectious Disease now being consulted regarding evaluation and management of Gram-negative bacteremia  He is feeling better today  He is eating well    He denies nausea, vomiting, diarrhea, or any abdominal pain at present  He has been having some post infectious cough status post is pneumonia  He is bringing up some green and dark phlegm intermittently  He denies any fever chills or sweats overnight  He denies any new or worsening left knee pain  He does not have any dysuria or any new low back pain  He does have healed lumbar incision from spinal surgery in 2021  He has an active fisherman and Shimon  He denies any known tick exposure or tick bite  He traveled to [place] via plane from [place] last Thursday for class and family reunions  REVIEW OF SYSTEMS:  A complete review of systems is negative other than that noted in the HPI  PAST MEDICAL HISTORY:  Past Medical History:   Diagnosis Date    Arthritis     [facility]     Disease of thyroid gland     Hypertension     [facility] ([facility] )     Renal disorder      Past Surgical History:   Procedure Laterality Date    BACK SURGERY      CARDIAC SURGERY      JOINT REPLACEMENT         FAMILY HISTORY:  Non-contributory    SOCIAL HISTORY:  Social History   Social History     Substance and Sexual Activity   Alcohol Use Never     Social History     Substance and Sexual Activity   Drug Use Never     Social History     Tobacco Use   Smoking Status Never Smoker   Smokeless Tobacco Current User       ALLERGIES:  Allergies   Allergen Reactions    Cefazolin Anaphylaxis    Penicillins Anaphylaxis    Accupril [Quinapril Hcl] Other (See Comments)     unknown    Quinapril Other (See Comments)     unknown       MEDICATIONS:  All current active medications have been reviewed      PHYSICAL EXAM:  Temp:  [97 4 °F (36 3 °C)-98 °F (36 7 °C)] 97 4 °F (36 3 °C)  HR:  [55-68] 68  Resp:  [17-18] 18  BP: (161-167)/(78-80) 161/78  SpO2:  [94 %-98 %] 94 %  Temp (24hrs), Av 7 °F (36 5 °C), Min:97 4 °F (36 3 °C), Max:98 °F (36 7 °C)  Current: Temperature: (!) 97 4 °F (36 3 °C)    Intake/Output Summary (Last 24 hours) at 9/7/2021 1141  Last data filed at 9/7/2021 0644  Gross per 24 hour   Intake --   Output 2450 ml   Net -2450 ml       General Appearance:  78year old elderly, male appearing well, nontoxic, and in no distress sitting in recliner   Head:  Normocephalic, without obvious abnormality, atraumatic   Eyes:  Conjunctiva pink and sclera anicteric, both eyes   Nose: Nares normal, mucosa normal, no drainage   Throat: Oropharynx moist without lesions   Neck: Supple, symmetrical, no adenopathy, no tenderness/mass/nodules   Back:   Symmetric, no curvature, ROM normal, no CVA tenderness, healed lumbar spine incision nontender to palpation   Lungs:   Clear to auscultation bilaterally, respirations unlabored   Chest Wall:  No tenderness or deformity, healed sternotomy incision  Heart:  RRR; + valve click   Abdomen:   Soft, no focal tenderness, protuberant, positive bowel sounds    Extremities: No cyanosis, clubbing or edema, left TKR well healed, no warmth, erythema, point tenderness or swelling  Skin: No rashes or lesions  No draining wounds noted  IV site nontender  Lymph nodes: Cervical, supraclavicular nodes normal   Neurologic: Alert and oriented times 3, extremity strength 5/5 and symmetric       LABS, IMAGING, & OTHER STUDIES:  Lab Results:  I have personally reviewed pertinent labs    Results from last 7 days   Lab Units 09/07/21  0506 09/06/21  0610 09/05/21  0651   WBC Thousand/uL 4 85 5 25 4 53   HEMOGLOBIN g/dL 14 9 14 2 15 1   PLATELETS Thousands/uL 103* 93* 83*     Results from last 7 days   Lab Units 09/07/21  0506 09/06/21  0609 09/05/21  0651   SODIUM mmol/L 137 136 136   POTASSIUM mmol/L 4 1 4 5 4 6   CHLORIDE mmol/L 105 104 104   CO2 mmol/L 24 23 22   BUN mg/dL 12 11 12   CREATININE mg/dL 0 94 0 91 0 96   EGFR ml/min/1 73sq m 80 83 78   CALCIUM mg/dL 9 0 9 3 8 6   AST U/L 33 52* 87*   ALT U/L 141* 178* 224*   ALK PHOS U/L 105 106 108     Results from last 7 days Lab Units 09/06/21  1258 09/06/21  1257 09/03/21  1338 09/03/21  1149   BLOOD CULTURE  Received in Microbiology Lab  Culture in Progress  Received in Microbiology Lab  Culture in Progress  No Growth at 72 hrs  Enterobacter aerogenes*   GRAM STAIN RESULT   --   --   --  Gram negative rods*     Results from last 7 days   Lab Units 09/04/21  0451 09/03/21  1659   PROCALCITONIN ng/ml 0 91* 1 39*                   Imaging Studies:   9/7/21 L knee/L hip XR no acute osseous abnormality  09/04/2021 right upper quadrant ultrasound:  Positive cholelithiasis  CBD prominent  09/03/2021 CT chest abdomen pelvis:  Mild bronchial wall thickening  No acute biliary pathology seen    Other Studies:   I have personally reviewed pertinent reports    9/3/21 EKG QTc 399

## 2021-09-07 NOTE — ASSESSMENT & PLAN NOTE
Lab Results   Component Value Date    EGFR 80 09/07/2021    EGFR 83 09/06/2021    EGFR 78 09/05/2021    CREATININE 0 94 09/07/2021    CREATININE 0 91 09/06/2021    CREATININE 0 96 09/05/2021     · Patient with history of horseshoe kidney  · Creatinine at baseline currently; baseline appears to be 1-1 2  · Continue to monitor

## 2021-09-07 NOTE — CASE MANAGEMENT
Case Management Assessment & Discharge Planning Note    Patient name Tristen [facility]  Location /-10 MRN [id number]  : 1945 Date 2021       Current Admission Date: 9/3/2021  Current Admission Diagnosis:  Sepsis [facility])   Patient Active Problem List   Diagnosis    CAD (coronary artery disease)    Benign prostatic hyperplasia without lower urinary tract symptoms    CKD (chronic kidney disease) stage 3, GFR 30-59 ml/min ([facility])    History of endocarditis    Horseshoe kidney with renal calculus    Hyperlipidemia associated with type 2 diabetes mellitus ([facility] )    Hypomagnesemia    Hypothyroidism    Transaminitis    [facility]    Elevated troponin    Thrombocytopathia ([place] [address] )    DM (diabetes mellitus), type 2 ([place] [address] )    Gram-negative bacteremia    Previous Admission - Discharge Date:    LOS (days): 4  Geometric Mean LOS (GMLOS) (days): 3 50  Days to GMLOS:-0 6 Previous Discharge Diagnosis:  No discharge information exists for this patient  Risk of Unplanned Readmission Score  Predictive Model Details          8 (Low)  Factor Value    Calculated 2021 12:08 32% Number of active Rx orders 39    Risk of Unplanned Readmission Model 23% Active NSAID Rx order present     11% ECG/EKG order present in last 6 months     8% Imaging order present in last 6 months     7% Age 78     7% Number of ED visits in last six months 1     5% Active anticoagulant Rx order present     5% Current length of stay 3 916 days     1% Charlson Comorbidity Index 1         BUNDLE:      Reason for Referral:    OBJECTIVE:  Pt is a 78y o  year old /[employer], white or  [1], male with Zoroastrian preference of None admitted on 9/3/2021 11:36 AM  Pt is admitted to [facility] [id number] at [address] [place] [address] with complaints of Sepsis ([facility] )     Current admission status: Inpatient       Preferred Pharmacy:   PATIENT/FAMILY REPORTS NO PREFERRED PHARMACY  No address on file      [id number]  [address]  Phone: [phone] Fax: [phone]    Primary Care Provider: Apple Gonzalez    Primary Insurance: MEDICARE  Secondary Insurance: BLUE CROSS    ASSESSMENT:  Active Health Care Agents    There are no active Health Care Agents on file  Advance Directives  Does patient have a Health Care POA?: Yes (4 POA  Wife, son Vonda Neumann, son Shila Mancera, and daughter Emily Alan)  Does patient have Advance Directives?: Yes  Advance Directives: Living will         [address] of Residence: resides in [place], [place]    Readmission Root Cause  30 Day Readmission: No    Patient Information  Mental Status: Alert  During Assessment patient was accompanied by: Not accompanied during assessment  Assessment information provided by[de-identified] Patient  Primary Caregiver: Self  What city do you live in?: [address] entry access options   Select all that apply : Ramp  Type of Current Residence: [facility]  Living Arrangements: Lives w/ Spouse/significant other  Is patient a ?: Yes  Is patient active with [facility])?: Yes  Is patient service connected?: Yes    Activities of Daily Living Prior to Admission  Functional Status: Assistance  Completes ADLs independently?: Yes  Ambulates independently?: Yes  Does patient use assisted devices?: Yes  Assisted Devices (DME) used: Ino Lapping (scooter)  Does patient currently own DME?: Yes  What DME does the patient currently own?: Ike Montesinos  Does patient currently have [facility]?: No         Patient Information Continued  Income Source: Pension/snf  Does patient have prescription coverage?: Yes  Does patient receive dialysis treatments?: No  Does patient have a history of substance abuse?: No  Does patient have a history of Mental Health Diagnosis?: No         Means of Transportation  Means of Transport to [facility][de-identified] Family transport  In the past 12 months, has lack of transportation kept you from medical appointments or from getting medications?: No  In the past 12 months, has lack of transportation kept you from meetings, work, or from getting things needed for daily living?: No  Was application for public transport provided?: No    DISCHARGE DETAILS:    Discharge planning discussed with[de-identified] patient  Freedom of Choice: Yes         [address]         Is the patient interested in [facility] at discharge?: No    DME Referral Provided  Referral made for DME?: No         We would like to be able to fill any required prescriptions on discharge at our [address] [place] [address] and have them delivered to you at discharge in your room    Would you like to participate in this program? : No - Declined    Discharge Destination Plan[de-identified] Home  Transportation at Discharge?: Yes (wife will transport pt)

## 2021-09-07 NOTE — PROGRESS NOTES
[facility]  Progress Note Pilar Palacios 1945, 78 y o  male MRN: [id number]  Unit/Bed#: -Susan Encounter: [id number]  Primary Care Provider: Apple Pagan   Date and time admitted to hospital: 9/3/2021 11:36 AM    Gram-negative bacteremia  Assessment & Plan  Patient does have 1/2 blood cultures positive for Enterobacter aerogenous  Apparently this is sensitive to aztreonam     Source for this is unclear  Clinically He does have some bronchitis  Abdominal exam is benign  No clinical signs of cholecystitis  CBD dilated but chronically apparently  No cholestatic pattern  No right upper quadrant pain  Denies any diarrhea actively now    Continue aztreonam for now  Unclear etiology  Will consult ID for further assistance      DM (diabetes mellitus), type 2 [facility])  Assessment & Plan  No results found for: HGBA1C    Recent Labs     09/06/21  1116 09/06/21  1647 09/06/21  2158 09/07/21  0751   POCGLU 219* 249* 321* 203*       Blood Sugar Average: Last 72 hrs:  · (P) 218 25 hold oral antihyperglycemics    · Correctional scale insulin  · Glargine 28 units at night  · lispro 12 units with dinner  · NovoLog 70/3022 units in the morning      Thrombocytopathia ([address] )  Assessment & Plan  · Platelets at baseline  · May be secondary to sepsis  · No signs of bleeding  · Monitor    Elevated troponin  Assessment & Plan  · Mildly elevated troponin with flat curve is likely non MI troponin elevation  Patient denies any chest pain      Transaminitis  Assessment & Plan  · Noted to have elevated AST and ALT  · Hepatitis panel negative  · Right upper quadrant ultrasound reveals cholelithiasis but no cholecystitis, no CBD dilation  · Etiology may be secondary to sepsis and fatty liver; will continue monitor    Monitor CMP    Horseshoe kidney with renal calculus  Assessment & Plan  Plan is under CKD    History of endocarditis  Assessment & Plan  · 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  Given history of endocarditis and bioprosthetic aortic valve if persistent bacteremia consider echocardiogram      CKD (chronic kidney disease) stage 3, GFR 30-59 ml/min [facility])  Assessment & Plan  Lab Results   Component Value Date    EGFR 80 09/07/2021    EGFR 83 09/06/2021    EGFR 78 09/05/2021    CREATININE 0 94 09/07/2021    CREATININE 0 91 09/06/2021    CREATININE 0 96 09/05/2021     · Patient with history of horseshoe kidney  · Creatinine at baseline currently; baseline appears to be 1-1 2  · Continue to monitor    Benign prostatic hyperplasia without lower urinary tract symptoms  Assessment & Plan  Continue tamsulosin    CAD (coronary artery disease)  Assessment & Plan  · Stable at this time  · Continue propranolol 60 mg daily    Of note, patient does mention some chest pain but this is not related to typical angina  Actually pain is reproducible to palpation  Likely in the setting of significant coughing has developed some costochondritis  Will apply some Voltaren gel  * Sepsis [facility])  Assessment & Plan  · Patient stated on admission that he woke up with fever and rigors the morning of admission and did state he had an episode of diarrhea today    Patient denies eating anything out of the ordinary yesterday and denies eating any shellfish or undercooked meats  · CT chest abdomen pelvis-mild bronchial wall thickening; no acute abnormality within abdomen or pelvis; several enhancing foci within posterior right hepatic lobe possibly representing hemangiomas; horseshoe kidney  · Chest x-ray-no acute cardiopulmonary disease  · Lactic acid 2 3  · WBC count normal    Possibilities include tracheobronchitis with transient bacteremia, infectious diarrhea/viral now resolved, or more concerning left knee prosthetic joint infection - although this now appears less likely as per Orthopedics    Plan  · Continue aztreonam given cefazolin and penicillin allergy  · Given the fact that there is only Enterobacter growing - discontinue vancomycin  · Follow-up final results of cultures  · Orthopedics have evaluated patient- case discussed, x-rays done, report pending - they do not have suspicion for joint infection at this point in time  · Monitor clinically, temperature curve, white count        VTE Pharmacologic Prophylaxis:   Pharmacologic: Heparin  Mechanical VTE Prophylaxis in Place: Yes    Patient Centered Rounds: I have performed bedside rounds with nursing staff today  Discussions with Specialists or Other Care Team Provider: cm, nursing    Education and Discussions with Family / Patient: pt    Time Spent for Care: 30 minutes  More than 50% of total time spent on counseling and coordination of care as described above  Current Length of Stay: 4 day(s)    Current Patient Status: Inpatient   Certification Statement: The patient will continue to require additional inpatient hospital stay due to See above    Discharge Plan:  Still requiring further evaluation of bacteremia    Code Status: Level 3 - DNAR and DNI      Subjective:   No acute complaints    Objective:     Vitals:   Temp (24hrs), Av 7 °F (36 5 °C), Min:97 4 °F (36 3 °C), Max:98 °F (36 7 °C)    Temp:  [97 4 °F (36 3 °C)-98 °F (36 7 °C)] 97 4 °F (36 3 °C)  HR:  [55-68] 68  Resp:  [17-18] 18  BP: (161-167)/(78-80) 161/78  SpO2:  [94 %-98 %] 94 %  Body mass index is 35 16 kg/m²  Input and Output Summary (last 24 hours): Intake/Output Summary (Last 24 hours) at 2021 0857  Last data filed at 2021 0644  Gross per 24 hour   Intake --   Output 3600 ml   Net -3600 ml       Physical Exam:     Physical Exam  Constitutional:       General: He is not in acute distress  Appearance: He is well-developed  He is not diaphoretic  HENT:      Head: Normocephalic and atraumatic  Nose: Nose normal       Mouth/Throat:      Pharynx: No oropharyngeal exudate  Eyes:      General: No scleral icterus  Conjunctiva/sclera: Conjunctivae normal    Cardiovascular:      Rate and Rhythm: Normal rate and regular rhythm  Heart sounds: Normal heart sounds  No murmur heard  No friction rub  No gallop  Pulmonary:      Effort: Pulmonary effort is normal  No respiratory distress  Breath sounds: Normal breath sounds  No wheezing or rales  Chest:      Chest wall: No tenderness  Abdominal:      General: Bowel sounds are normal  There is no distension  Palpations: Abdomen is soft  Tenderness: There is no abdominal tenderness  There is no guarding  Musculoskeletal:         General: No tenderness or deformity  Normal range of motion  Cervical back: Normal range of motion and neck supple  Skin:     General: Skin is warm and dry  Findings: No erythema  Neurological:      Mental Status: He is alert  Mental status is at baseline             Additional Data:     Labs:    Results from last 7 days   Lab Units 09/07/21  0506   WBC Thousand/uL 4 85   HEMOGLOBIN g/dL 14 9   HEMATOCRIT % 45 0   PLATELETS Thousands/uL 103*   NEUTROS PCT % 57   LYMPHS PCT % 18   MONOS PCT % 9   EOS PCT % 14*     Results from last 7 days   Lab Units 09/07/21  0506   SODIUM mmol/L 137   POTASSIUM mmol/L 4 1   CHLORIDE mmol/L 105   CO2 mmol/L 24   BUN mg/dL 12   CREATININE mg/dL 0 94   ANION GAP mmol/L 8   CALCIUM mg/dL 9 0   ALBUMIN g/dL 2 9*   TOTAL BILIRUBIN mg/dL 0 45   ALK PHOS U/L 105   ALT U/L 141*   AST U/L 33   GLUCOSE RANDOM mg/dL 195*     Results from last 7 days   Lab Units 09/03/21  1659   INR  1 18     Results from last 7 days   Lab Units 09/07/21  0751 09/06/21  2158 09/06/21  1647 09/06/21  1116 09/06/21  0710 09/05/21  2059 09/05/21  1624 09/05/21  0722 09/04/21  2042 09/04/21  1622 09/04/21  1120 09/04/21  0714   POC GLUCOSE mg/dl 203* 321* 249* 219* 201* 261* 233* 165* 228* 216* 179* 144*         Results from last 7 days   Lab Units 09/04/21  0451 09/03/21  1659 09/03/21  1338 09/03/21  1149   LACTIC ACID mmol/L  --  1 9 2 3* 2 2*   PROCALCITONIN ng/ml 0 91* 1 39*  --   --            * I Have Reviewed All Lab Data Listed Above  * Additional Pertinent Lab Tests Reviewed: All Labs Within Last 24 Hours Reviewed    Imaging:    Imaging Reports Reviewed Today Include: na  Imaging Personally Reviewed by Myself Includes:  na    Recent Cultures (last 7 days):     Results from last 7 days   Lab Units 09/06/21  1258 09/06/21  1257 09/03/21  1338 09/03/21  1149   BLOOD CULTURE  Received in Microbiology Lab  Culture in Progress  Received in Microbiology Lab  Culture in Progress  No Growth at 72 hrs   Enterobacter aerogenes*   GRAM STAIN RESULT   --   --   --  Gram negative rods*       Last 24 Hours Medication List:   Current Facility-Administered Medications   Medication Dose Route Frequency Provider Last Rate    acetaminophen  650 mg Oral Q6H PRN Yazan Jimenez, DO      aspirin  81 mg Oral Daily Yazan Jimenez, DO      aztreonam  2,000 mg Intravenous Q8H Yazan Jimenez, DO 2,000 mg (09/07/21 2127)    Diclofenac Sodium  2 g Topical 4x Daily Baudilio Juarez MD      gabapentin  800 mg Oral TID Yazan Jimenez, DO      guaiFENesin  600 mg Oral Q12H [address] Baudilio Juarez MD      heparin (porcine)  5,000 Units Subcutaneous [facility] Yazan Jimenez, [place]      HYDROmorphone  0 5 mg Intravenous Q6H PRN Baduilio Juarez MD      insulin aspart protamine-insulin aspart  22 Units Subcutaneous Daily Yazan Jimenez,       insulin glargine  28 Units Subcutaneous HS Yazan Jimenez, DO      insulin lispro  1-5 Units Subcutaneous HS Yazan Jimenez,       insulin lispro  12 Units Subcutaneous Daily With Colby Valdez Ciageri, DO      insulin lispro  2-12 Units Subcutaneous TID [facility] Yazan Jimenez, DO      levothyroxine  50 mcg Oral Daily Yazan Jimenez, DO      loratadine  10 mg Oral Daily Yazan Jimenez, DO      metroNIDAZOLE  500 mg Intravenous Q8H Yazan Jimenez,  mg (09/07/21 3971)    oxyCODONE  5 mg Oral Q4H PRN Jerardo Melendez DO      pramipexole  1 5 mg Oral BID Jerardo Melendez DO      propranolol  60 mg Oral Daily Jerardo Meelndez DO      tamsulosin  0 4 mg Oral Daily With Dinner Jerardo Melendez DO          Today, Patient Was Seen By: Froilan Garcia MD    ** Please Note: Dictation voice to text software may have been used in the creation of this document   **

## 2021-09-08 ENCOUNTER — ANESTHESIA EVENT (INPATIENT)
Dept: PERIOP | Facility: HOSPITAL | Age: 76
DRG: 872 | End: 2021-09-08
Payer: MEDICARE

## 2021-09-08 ENCOUNTER — ANESTHESIA (INPATIENT)
Dept: PERIOP | Facility: HOSPITAL | Age: 76
DRG: 872 | End: 2021-09-08
Payer: MEDICARE

## 2021-09-08 ENCOUNTER — APPOINTMENT (INPATIENT)
Dept: RADIOLOGY | Facility: HOSPITAL | Age: 76
DRG: 872 | End: 2021-09-08
Payer: MEDICARE

## 2021-09-08 ENCOUNTER — APPOINTMENT (INPATIENT)
Dept: PERIOP | Facility: HOSPITAL | Age: 76
DRG: 872 | End: 2021-09-08
Payer: MEDICARE

## 2021-09-08 PROBLEM — K80.50 CHOLEDOCHOLITHIASIS: Status: ACTIVE | Noted: 2021-09-08

## 2021-09-08 LAB
ANION GAP SERPL CALCULATED.3IONS-SCNC: 9 MMOL/L (ref 4–13)
B MICROTI IGG TITR SER: NORMAL {TITER}
B MICROTI IGM TITR SER: NORMAL {TITER}
BACTERIA BLD CULT: NORMAL
BASOPHILS # BLD AUTO: 0.07 THOUSANDS/ΜL (ref 0–0.1)
BASOPHILS NFR BLD AUTO: 1 % (ref 0–1)
BUN SERPL-MCNC: 16 MG/DL (ref 5–25)
CALCIUM SERPL-MCNC: 9.2 MG/DL (ref 8.3–10.1)
CHLORIDE SERPL-SCNC: 104 MMOL/L (ref 100–108)
CO2 SERPL-SCNC: 24 MMOL/L (ref 21–32)
CREAT SERPL-MCNC: 0.95 MG/DL (ref 0.6–1.3)
EOSINOPHIL # BLD AUTO: 0.7 THOUSAND/ΜL (ref 0–0.61)
EOSINOPHIL NFR BLD AUTO: 11 % (ref 0–6)
ERYTHROCYTE [DISTWIDTH] IN BLOOD BY AUTOMATED COUNT: 14.2 % (ref 11.6–15.1)
GFR SERPL CREATININE-BSD FRML MDRD: 79 ML/MIN/1.73SQ M
GLUCOSE SERPL-MCNC: 157 MG/DL (ref 65–140)
GLUCOSE SERPL-MCNC: 160 MG/DL (ref 65–140)
GLUCOSE SERPL-MCNC: 169 MG/DL (ref 65–140)
GLUCOSE SERPL-MCNC: 171 MG/DL (ref 65–140)
GLUCOSE SERPL-MCNC: 176 MG/DL (ref 65–140)
GLUCOSE SERPL-MCNC: 178 MG/DL (ref 65–140)
GLUCOSE SERPL-MCNC: 246 MG/DL (ref 65–140)
HAV IGM SER QL: NORMAL
HBV CORE IGM SER QL: NORMAL
HBV SURFACE AG SER QL: NORMAL
HCT VFR BLD AUTO: 43.9 % (ref 36.5–49.3)
HCV AB SER QL: NORMAL
HGB BLD-MCNC: 14.5 G/DL (ref 12–17)
IMM GRANULOCYTES # BLD AUTO: 0.08 THOUSAND/UL (ref 0–0.2)
IMM GRANULOCYTES NFR BLD AUTO: 1 % (ref 0–2)
LYMPHOCYTES # BLD AUTO: 0.98 THOUSANDS/ΜL (ref 0.6–4.47)
LYMPHOCYTES NFR BLD AUTO: 15 % (ref 14–44)
MCH RBC QN AUTO: 28 PG (ref 26.8–34.3)
MCHC RBC AUTO-ENTMCNC: 33 G/DL (ref 31.4–37.4)
MCV RBC AUTO: 85 FL (ref 82–98)
MONOCYTES # BLD AUTO: 0.55 THOUSAND/ΜL (ref 0.17–1.22)
MONOCYTES NFR BLD AUTO: 9 % (ref 4–12)
NEUTROPHILS # BLD AUTO: 4.04 THOUSANDS/ΜL (ref 1.85–7.62)
NEUTS SEG NFR BLD AUTO: 63 % (ref 43–75)
NRBC BLD AUTO-RTO: 0 /100 WBCS
PLATELET # BLD AUTO: 116 THOUSANDS/UL (ref 149–390)
PMV BLD AUTO: 9.7 FL (ref 8.9–12.7)
POTASSIUM SERPL-SCNC: 4.2 MMOL/L (ref 3.5–5.3)
RBC # BLD AUTO: 5.17 MILLION/UL (ref 3.88–5.62)
SODIUM SERPL-SCNC: 137 MMOL/L (ref 136–145)
WBC # BLD AUTO: 6.42 THOUSAND/UL (ref 4.31–10.16)

## 2021-09-08 PROCEDURE — 93306 TTE W/DOPPLER COMPLETE: CPT | Performed by: INTERNAL MEDICINE

## 2021-09-08 PROCEDURE — 43264 ERCP REMOVE DUCT CALCULI: CPT | Performed by: INTERNAL MEDICINE

## 2021-09-08 PROCEDURE — C2625 STENT, NON-COR, TEM W/DEL SY: HCPCS

## 2021-09-08 PROCEDURE — 99231 SBSQ HOSP IP/OBS SF/LOW 25: CPT | Performed by: PHYSICIAN ASSISTANT

## 2021-09-08 PROCEDURE — 99232 SBSQ HOSP IP/OBS MODERATE 35: CPT | Performed by: INTERNAL MEDICINE

## 2021-09-08 PROCEDURE — 43274 ERCP DUCT STENT PLACEMENT: CPT | Performed by: INTERNAL MEDICINE

## 2021-09-08 PROCEDURE — BF101ZZ FLUOROSCOPY OF BILE DUCTS USING LOW OSMOLAR CONTRAST: ICD-10-PCS | Performed by: INTERNAL MEDICINE

## 2021-09-08 PROCEDURE — 82948 REAGENT STRIP/BLOOD GLUCOSE: CPT

## 2021-09-08 PROCEDURE — C1769 GUIDE WIRE: HCPCS

## 2021-09-08 PROCEDURE — 0FC98ZZ EXTIRPATION OF MATTER FROM COMMON BILE DUCT, VIA NATURAL OR ARTIFICIAL OPENING ENDOSCOPIC: ICD-10-PCS | Performed by: INTERNAL MEDICINE

## 2021-09-08 PROCEDURE — 80048 BASIC METABOLIC PNL TOTAL CA: CPT | Performed by: INTERNAL MEDICINE

## 2021-09-08 PROCEDURE — 0F798DZ DILATION OF COMMON BILE DUCT WITH INTRALUMINAL DEVICE, VIA NATURAL OR ARTIFICIAL OPENING ENDOSCOPIC: ICD-10-PCS | Performed by: INTERNAL MEDICINE

## 2021-09-08 PROCEDURE — 85025 COMPLETE CBC W/AUTO DIFF WBC: CPT | Performed by: INTERNAL MEDICINE

## 2021-09-08 PROCEDURE — 99223 1ST HOSP IP/OBS HIGH 75: CPT | Performed by: SURGERY

## 2021-09-08 PROCEDURE — 80074 ACUTE HEPATITIS PANEL: CPT | Performed by: PHYSICIAN ASSISTANT

## 2021-09-08 PROCEDURE — C1726 CATH, BAL DIL, NON-VASCULAR: HCPCS

## 2021-09-08 PROCEDURE — 74328 X-RAY BILE DUCT ENDOSCOPY: CPT

## 2021-09-08 RX ORDER — FENTANYL CITRATE 50 UG/ML
INJECTION, SOLUTION INTRAMUSCULAR; INTRAVENOUS AS NEEDED
Status: DISCONTINUED | OUTPATIENT
Start: 2021-09-08 | End: 2021-09-08

## 2021-09-08 RX ORDER — ONDANSETRON 2 MG/ML
4 INJECTION INTRAMUSCULAR; INTRAVENOUS ONCE AS NEEDED
Status: DISCONTINUED | OUTPATIENT
Start: 2021-09-08 | End: 2021-09-08 | Stop reason: HOSPADM

## 2021-09-08 RX ORDER — ONDANSETRON 2 MG/ML
INJECTION INTRAMUSCULAR; INTRAVENOUS AS NEEDED
Status: DISCONTINUED | OUTPATIENT
Start: 2021-09-08 | End: 2021-09-08

## 2021-09-08 RX ORDER — SODIUM CHLORIDE, SODIUM LACTATE, POTASSIUM CHLORIDE, CALCIUM CHLORIDE 600; 310; 30; 20 MG/100ML; MG/100ML; MG/100ML; MG/100ML
INJECTION, SOLUTION INTRAVENOUS CONTINUOUS PRN
Status: DISCONTINUED | OUTPATIENT
Start: 2021-09-08 | End: 2021-09-08

## 2021-09-08 RX ORDER — PROPOFOL 10 MG/ML
INJECTION, EMULSION INTRAVENOUS AS NEEDED
Status: DISCONTINUED | OUTPATIENT
Start: 2021-09-08 | End: 2021-09-08

## 2021-09-08 RX ORDER — FENTANYL CITRATE/PF 50 MCG/ML
25 SYRINGE (ML) INJECTION
Status: DISCONTINUED | OUTPATIENT
Start: 2021-09-08 | End: 2021-09-08 | Stop reason: HOSPADM

## 2021-09-08 RX ORDER — HEPARIN SODIUM 5000 [USP'U]/ML
5000 INJECTION, SOLUTION INTRAVENOUS; SUBCUTANEOUS EVERY 8 HOURS SCHEDULED
Status: DISCONTINUED | OUTPATIENT
Start: 2021-09-08 | End: 2021-09-10 | Stop reason: HOSPADM

## 2021-09-08 RX ORDER — DEXAMETHASONE SODIUM PHOSPHATE 10 MG/ML
INJECTION, SOLUTION INTRAMUSCULAR; INTRAVENOUS AS NEEDED
Status: DISCONTINUED | OUTPATIENT
Start: 2021-09-08 | End: 2021-09-08

## 2021-09-08 RX ORDER — LIDOCAINE HYDROCHLORIDE 10 MG/ML
INJECTION, SOLUTION EPIDURAL; INFILTRATION; INTRACAUDAL; PERINEURAL AS NEEDED
Status: DISCONTINUED | OUTPATIENT
Start: 2021-09-08 | End: 2021-09-08

## 2021-09-08 RX ORDER — SUCCINYLCHOLINE/SOD CL,ISO/PF 100 MG/5ML
SYRINGE (ML) INTRAVENOUS AS NEEDED
Status: DISCONTINUED | OUTPATIENT
Start: 2021-09-08 | End: 2021-09-08

## 2021-09-08 RX ORDER — SODIUM CHLORIDE, SODIUM LACTATE, POTASSIUM CHLORIDE, CALCIUM CHLORIDE 600; 310; 30; 20 MG/100ML; MG/100ML; MG/100ML; MG/100ML
20 INJECTION, SOLUTION INTRAVENOUS CONTINUOUS
Status: DISCONTINUED | OUTPATIENT
Start: 2021-09-08 | End: 2021-09-10

## 2021-09-08 RX ADMIN — METRONIDAZOLE 500 MG: 500 INJECTION, SOLUTION INTRAVENOUS at 05:35

## 2021-09-08 RX ADMIN — GABAPENTIN 800 MG: 400 CAPSULE ORAL at 22:00

## 2021-09-08 RX ADMIN — GUAIFENESIN 600 MG: 600 TABLET ORAL at 22:00

## 2021-09-08 RX ADMIN — OXYCODONE HYDROCHLORIDE 5 MG: 5 TABLET ORAL at 00:44

## 2021-09-08 RX ADMIN — ASPIRIN 81 MG CHEWABLE TABLET 81 MG: 81 TABLET CHEWABLE at 08:53

## 2021-09-08 RX ADMIN — PROPOFOL 160 MG: 10 INJECTION, EMULSION INTRAVENOUS at 12:46

## 2021-09-08 RX ADMIN — DICLOFENAC SODIUM 2 G: 10 GEL TOPICAL at 17:16

## 2021-09-08 RX ADMIN — SODIUM CHLORIDE, SODIUM LACTATE, POTASSIUM CHLORIDE, AND CALCIUM CHLORIDE: .6; .31; .03; .02 INJECTION, SOLUTION INTRAVENOUS at 13:17

## 2021-09-08 RX ADMIN — PRAMIPEXOLE DIHYDROCHLORIDE 1.5 MG: 0.5 TABLET ORAL at 17:16

## 2021-09-08 RX ADMIN — LORATADINE 10 MG: 10 TABLET ORAL at 08:53

## 2021-09-08 RX ADMIN — PRAMIPEXOLE DIHYDROCHLORIDE 1.5 MG: 0.5 TABLET ORAL at 08:53

## 2021-09-08 RX ADMIN — GABAPENTIN 800 MG: 400 CAPSULE ORAL at 08:53

## 2021-09-08 RX ADMIN — IOHEXOL 38 ML: 240 INJECTION, SOLUTION INTRATHECAL; INTRAVASCULAR; INTRAVENOUS; ORAL at 13:16

## 2021-09-08 RX ADMIN — GLUCAGON HYDROCHLORIDE 1 MG: KIT at 13:04

## 2021-09-08 RX ADMIN — LEVOTHYROXINE SODIUM 50 MCG: 50 TABLET ORAL at 08:53

## 2021-09-08 RX ADMIN — INSULIN LISPRO 2 UNITS: 100 INJECTION, SOLUTION INTRAVENOUS; SUBCUTANEOUS at 22:18

## 2021-09-08 RX ADMIN — FENTANYL CITRATE 25 MCG: 50 INJECTION, SOLUTION INTRAMUSCULAR; INTRAVENOUS at 13:29

## 2021-09-08 RX ADMIN — GABAPENTIN 800 MG: 400 CAPSULE ORAL at 17:16

## 2021-09-08 RX ADMIN — LIDOCAINE HYDROCHLORIDE 50 MG: 10 INJECTION, SOLUTION EPIDURAL; INFILTRATION; INTRACAUDAL; PERINEURAL at 12:46

## 2021-09-08 RX ADMIN — TAMSULOSIN HYDROCHLORIDE 0.4 MG: 0.4 CAPSULE ORAL at 17:16

## 2021-09-08 RX ADMIN — GUAIFENESIN 600 MG: 600 TABLET ORAL at 08:53

## 2021-09-08 RX ADMIN — METRONIDAZOLE 500 MG: 500 INJECTION, SOLUTION INTRAVENOUS at 14:29

## 2021-09-08 RX ADMIN — INSULIN ASPART 22 UNITS: 100 INJECTION, SUSPENSION SUBCUTANEOUS at 08:55

## 2021-09-08 RX ADMIN — ONDANSETRON 4 MG: 2 INJECTION INTRAMUSCULAR; INTRAVENOUS at 12:58

## 2021-09-08 RX ADMIN — DICLOFENAC SODIUM 2 G: 10 GEL TOPICAL at 22:19

## 2021-09-08 RX ADMIN — INSULIN LISPRO 12 UNITS: 100 INJECTION, SOLUTION INTRAVENOUS; SUBCUTANEOUS at 17:16

## 2021-09-08 RX ADMIN — Medication 100 MG: at 12:46

## 2021-09-08 RX ADMIN — HYDROMORPHONE HYDROCHLORIDE 0.5 MG: 1 INJECTION, SOLUTION INTRAMUSCULAR; INTRAVENOUS; SUBCUTANEOUS at 19:27

## 2021-09-08 RX ADMIN — HEPARIN SODIUM 5000 UNITS: 5000 INJECTION INTRAVENOUS; SUBCUTANEOUS at 22:15

## 2021-09-08 RX ADMIN — DICLOFENAC SODIUM 2 G: 10 GEL TOPICAL at 08:54

## 2021-09-08 RX ADMIN — LEVOFLOXACIN 750 MG: 500 TABLET, FILM COATED ORAL at 14:29

## 2021-09-08 RX ADMIN — INDOMETHACIN 100 MG: 50 SUPPOSITORY RECTAL at 12:56

## 2021-09-08 RX ADMIN — METRONIDAZOLE 500 MG: 500 INJECTION, SOLUTION INTRAVENOUS at 22:15

## 2021-09-08 RX ADMIN — SODIUM CHLORIDE, SODIUM LACTATE, POTASSIUM CHLORIDE, AND CALCIUM CHLORIDE: .6; .31; .03; .02 INJECTION, SOLUTION INTRAVENOUS at 11:35

## 2021-09-08 RX ADMIN — SODIUM CHLORIDE, SODIUM LACTATE, POTASSIUM CHLORIDE, AND CALCIUM CHLORIDE 20 ML/HR: .6; .31; .03; .02 INJECTION, SOLUTION INTRAVENOUS at 14:30

## 2021-09-08 RX ADMIN — FENTANYL CITRATE 50 MCG: 50 INJECTION, SOLUTION INTRAMUSCULAR; INTRAVENOUS at 12:46

## 2021-09-08 RX ADMIN — OXYCODONE HYDROCHLORIDE 5 MG: 5 TABLET ORAL at 08:59

## 2021-09-08 RX ADMIN — INSULIN GLARGINE 28 UNITS: 100 INJECTION, SOLUTION SUBCUTANEOUS at 22:00

## 2021-09-08 RX ADMIN — INSULIN LISPRO 2 UNITS: 100 INJECTION, SOLUTION INTRAVENOUS; SUBCUTANEOUS at 17:17

## 2021-09-08 RX ADMIN — PROPRANOLOL HYDROCHLORIDE 60 MG: 20 TABLET ORAL at 08:52

## 2021-09-08 RX ADMIN — DEXAMETHASONE SODIUM PHOSPHATE 4 MG: 10 INJECTION, SOLUTION INTRAMUSCULAR; INTRAVENOUS at 12:58

## 2021-09-08 RX ADMIN — HEPARIN SODIUM 5000 UNITS: 5000 INJECTION INTRAVENOUS; SUBCUTANEOUS at 05:33

## 2021-09-08 RX ADMIN — FENTANYL CITRATE 25 MCG: 50 INJECTION, SOLUTION INTRAMUSCULAR; INTRAVENOUS at 13:06

## 2021-09-08 NOTE — APP STUDENT NOTE
Consultation - [address] [place] [address] 78 y o  male MRN: [id number]  Unit/Bed#: -01 Encounter: [id number]    Assessment/Plan     Assessment:  Patient is a 71y  o  male  -Choledocholithiasis:               MRI confirmed choledocholithiasis with a 1 4cm dilation of the CBD   On admission AST was elevated (570) ad ALT was elevated (485), AST    decreased to 33 and ALT decreased to 141 today              ERCP showed multiple pigmented non-obstructing stones in the CBD with sludge present, largest stone measuring 10 mm, one large pigmented stone could not be removed   -Bacteremia:              Gram (-) bacteria, enterobacter aerogenous found on blood culture  -Other known illnesses:              Hepatic Steatosis              Thrombocytopenia              DM              CAD              Stage 3 CKD  Plan:  Possible laparoscopic cholecystectomy, consult cardiology to determine surgery this admission or in a few weeks in [place] once medically stable  -Remove stent placed during ERCP in 6-8wks  -Continue Pain Control  -IV abx per SLIM  -Trend Labs and monitor vitals  -Serial abdominal exams     History of Present Illness     HPI:  Abhishek White is a 78 y o  male with a history of DM, CAD, and HTN presented to the emergency room with chills, nausea, and diarrhea  He states his temperature was taken at home and was 40 1C  Blood work showed he had transaminitis, sepsis, and elevated troponins  MRCP showed choledocholithiasis  Patient underwent an ERCP today for stone removal today  A stent was placed during the ERCP  Patient is having mild abdominal discomfort, but is having less pain than he has been  Pain is at the RUQ  He has remained afibrile since starting abx treatment  Patient denies N/V/D, chills, leg pain, chest pain, or SOB  Consults    Review of Systems   Constitutional: Negative for appetite change, chills, diaphoresis and fever  HENT: Negative  Respiratory: Positive for cough   Negative for apnea, shortness of breath and wheezing  Cardiovascular: Negative for chest pain and leg swelling  Gastrointestinal: Positive for abdominal pain  Negative for abdominal distention, constipation, diarrhea, nausea and vomiting  Genitourinary: Positive for urgency  Negative for difficulty urinating  Musculoskeletal: Positive for back pain and myalgias  Skin: Negative for color change         Historical Information   Past Medical History:   Diagnosis Date    Arthritis     Diabetes mellitus ([facility] )     Disease of thyroid gland     Heart murmur     Hypertension     MI (myocardial infarction) ([facility] )     Renal disorder      Past Surgical History:   Procedure Laterality Date    BACK SURGERY      CARDIAC SURGERY      JOINT REPLACEMENT       Social History   Social History     Substance and Sexual Activity   Alcohol Use Never     Social History     Substance and Sexual Activity   Drug Use Never     E-Cigarette/Vaping    E-Cigarette Use Never User      E-Cigarette/Vaping Substances     Social History     Tobacco Use   Smoking Status Never Smoker   Smokeless Tobacco Never Used     Family History: non-contributory    Meds/Allergies   all current active meds have been reviewed and current meds:   Current Facility-Administered Medications   Medication Dose Route Frequency    acetaminophen (TYLENOL) tablet 650 mg  650 mg Oral Q6H PRN    aspirin chewable tablet 81 mg  81 mg Oral Daily    Diclofenac Sodium (VOLTAREN) 1 % topical gel 2 g  2 g Topical 4x Daily    gabapentin (NEURONTIN) capsule 800 mg  800 mg Oral TID    guaiFENesin (MUCINEX) 12 hr tablet 600 mg  600 mg Oral Q12H [facility]    heparin (porcine) subcutaneous injection 5,000 Units  5,000 Units Subcutaneous Q8H [facility]    HYDROmorphone (DILAUDID) injection 0 5 mg  0 5 mg Intravenous Q6H PRN    insulin aspart protamine-insulin aspart (NovoLOG 70/30) 100 units/mL subcutaneous injection 22 Units  22 Units Subcutaneous Daily    insulin glargine (LANTUS) subcutaneous injection 28 Units 0 28 mL  28 Units Subcutaneous HS    insulin lispro (HumaLOG) 100 units/mL subcutaneous injection 1-5 Units  1-5 Units Subcutaneous HS    insulin lispro (HumaLOG) 100 units/mL subcutaneous injection 12 Units  12 Units Subcutaneous Daily With Dinner    insulin lispro (HumaLOG) 100 units/mL subcutaneous injection 2-12 Units  2-12 Units Subcutaneous TID AC    iohexol (OMNIPAQUE) 240 MG/ML solution   Injection Code/Trauma/Sedation Med    lactated ringers infusion  20 mL/hr Intravenous Continuous    levofloxacin (LEVAQUIN) tablet 750 mg  750 mg Oral Q24H    levothyroxine tablet 50 mcg  50 mcg Oral Daily    loratadine (CLARITIN) tablet 10 mg  10 mg Oral Daily    metroNIDAZOLE (FLAGYL) IVPB (premix) 500 mg 100 mL  500 mg Intravenous Q8H    oxyCODONE (ROXICODONE) IR tablet 5 mg  5 mg Oral Q4H PRN    pramipexole (MIRAPEX) tablet 1 5 mg  1 5 mg Oral BID    propranolol (INDERAL) tablet 60 mg  60 mg Oral Daily    tamsulosin (FLOMAX) capsule 0 4 mg  0 4 mg Oral Daily With Dinner     Allergies   Allergen Reactions    Cefazolin Anaphylaxis    Penicillins Anaphylaxis    Accupril [Quinapril Hcl] Other (See Comments)     unknown    Quinapril Other (See Comments)     unknown       Objective   First Vitals:   Blood Pressure: 108/57 (09/03/21 1146)  Pulse: 84 (09/03/21 1146)  Temperature: 98 °F (36 7 °C) (09/03/21 1146)  Temp Source: Oral (09/03/21 1146)  Respirations: 18 (09/03/21 1146)  Height: 6' 3" (190 5 cm) (09/03/21 1532)  Weight - Scale: 128 kg (281 lb 4 9 oz) (09/03/21 1146)  SpO2: 98 % (09/03/21 1146)    Current Vitals:   Blood Pressure: 149/73 (09/08/21 1543)  Pulse: 59 (09/08/21 1400)  Temperature: 97 6 °F (36 4 °C) (09/08/21 1543)  Temp Source: Temporal (09/08/21 1135)  Respirations: 20 (09/08/21 1543)  Height: 6' 3" (190 5 cm) (09/03/21 1532)  Weight - Scale: 128 kg (281 lb 4 9 oz) (09/03/21 1146)  SpO2: 96 % (09/08/21 1400)      Intake/Output Summary (Last 24 hours) at 9/8/2021 [address] filed at 9/8/2021 1317  Gross per 24 hour   Intake 500 ml   Output 500 ml   Net 0 ml       Invasive Devices     Peripheral Intravenous Line            Peripheral IV 09/07/21 Dorsal (posterior); Left Forearm <1 day          Drain            External Urinary Catheter Small 4 days                Physical Exam:  General appearance: Patient appears alert and awake, non distress, looks appropriate for age  Skin: No juandice, warm and dry  Eyes: PERRL, no icterus   Heart: RRR, No murmurs, rub, gallop, S1 and S2 were audible  Lungs: Effort was normal, no wheezing, rhonchi, rales, or crackles, bilaterally symmetrical   Abdominal: No distention, surgical scars noted (3 inch scar from appendectomy RLQ, 18inch scar LLQ from back surgery) Normoactive bowel sounds in all quadrants, RUQ tenderness on light and deep palpation, pain positive for rebound, no guarding  Extremities: No tenderness or edema  Lab Results:   I have personally reviewed pertinent lab results  , CBC:   Lab Results   Component Value Date    WBC 6 42 09/08/2021    HGB 14 5 09/08/2021    HCT 43 9 09/08/2021    MCV 85 09/08/2021     (L) 09/08/2021    MCH 28 0 09/08/2021    MCHC 33 0 09/08/2021    RDW 14 2 09/08/2021    MPV 9 7 09/08/2021    NRBC 0 09/08/2021   , CMP:   Lab Results   Component Value Date    SODIUM 137 09/08/2021    K 4 2 09/08/2021     09/08/2021    CO2 24 09/08/2021    BUN 16 09/08/2021    CREATININE 0 95 09/08/2021    CALCIUM 9 2 09/08/2021    EGFR 79 09/08/2021   , Lipase: No results found for: LIPASE  Imaging: I have personally reviewed pertinent reports  EKG, Pathology, and Other Studies: I have personally reviewed pertinent reports  Counseling / Coordination of Care  Total floor / unit time spent today 35 minutes  Greater than 50% of total time was spent with the patient and / or family counseling and / or coordination of care  A description of the counseling / coordination of care: Bailey Pinzon

## 2021-09-08 NOTE — PROGRESS NOTES
Progress Note - Infectious Disease   Dedra John 78 y o  male MRN: [id number]  Unit/Bed#: -01 Encounter: [id number]      Impression/Plan:  1  Sepsis  POA with fever, tachypnea, thrombocytopenia and lactic acidosis with marked elevated transaminase levels  Admission blood cultures grew 1 of 2 sets enterobacter aerogenes  Patient had generalized abdominal discomfort on admission and 1 episode of diarrhea that day  Right upper quadrant ultrasound showed some CBD prominence  LFTs are trending down and patient is clinically improving  Consider biliary source of #1/2   -continue Levaquin 750 mg PO daily   -follow-up repeat blood cultures and adjust antibiotics as needed  -monitor temperature and hemodynamics  -serial exam  -recheck CBC and CMP in a m   -GI on board     2  Enterobacter aerogenes bacteremia  Admission blood cultures grew 1 of 2 sets enterobacter aerogenes  Patient had generalized abdominal discomfort on admission and 1 episode of diarrhea that day  Right upper quadrant ultrasound showed some CBD prominence  LFTs are trending down and patient is clinically improving  Consider biliary source of #1/2   -antibiotics as above  -monitor temperature and hemodynamics  -follow-up repeat blood cultures     3   Transaminitis  POA  Patient had generalized abdominal discomfort on admission and 1 episode of diarrhea that day  Right upper quadrant ultrasound showed some CBD prominence  LFTs are trending down  MRI/MRCP with CBD dilatation and multiple stones in CBD  -continue antibiotic as above  -GI following  -for ERCP this afternoon  -surgical consulted for cholecystectomy and planning in progress for Friday   -trend LFTs     4  PCN/Cefazolin Allergies  Patient tolerated desensitization to penicillin in 2013 for treatment of Enterococcus bacteremia/endocarditis    Patient's wife relays that he had cefazolin within the last few years and developed an a shortness of breath, cold sweats and rash reaction  Patient tolerated 1 dose of Levaquin in the ER and a Levaquin 1 week course in 2021  EKG Qtc 399  -continue Levaquin  -monitor closely for tolerance   -potential rare side effects reviewed and patient agreeable to proceed with Levaquin      5  Prior enterococcus left TKR infection, 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     -antibiotics as above  -monitor temperature and hemodynamics  -follow-up repeat blood cultures     Antibiotics:  Levaquin D2     Above impression and plan discussed in detail with patient, sister and wife at bedside, RN, and primary care team     Subjective:  Patient has no fever, chills, sweats overnight; no nausea, vomiting, diarrhea; no cough, shortness of breath; no RUQ pain  NPO at present  Appears to be tolerating antibiotic well  Objective:  Vitals:  Temp:  [97 3 °F (36 3 °C)-98 1 °F (36 7 °C)] 98 1 °F (36 7 °C)  HR:  [54-63] 54  Resp:  [16-18] 16  BP: (134-163)/(63-79) 134/63  SpO2:  [93 %-98 %] 98 %  Temp (24hrs), Av 7 °F (36 5 °C), Min:97 3 °F (36 3 °C), Max:98 1 °F (36 7 °C)  Current: Temperature: 98 1 °F (36 7 °C)    Physical Exam:   General Appearance:  Alert, interactive, nontoxic, no acute distress  Throat: Oropharynx moist without lesions  Lungs:   Clear to auscultation bilaterally; no wheezes, rhonchi or rales; respirations unlabored   Heart:  RRR; no murmur   Abdomen:   Soft, no focal tenderness, protuberant, positive bowel sounds  Extremities: No clubbing, cyanosis or edema   : No jimenes, no SPT   Skin: No new rashes or lesions  IV site nontender  No draining wounds noted         Labs, Imaging, & Other studies:   All pertinent labs and imaging studies were personally reviewed  Results from last 7 days   Lab Units 21  0627 21  0506 21  0610   WBC Thousand/uL 6 42 4 85 5 25   HEMOGLOBIN g/dL 14 5 14 9 14 2   PLATELETS Thousands/uL 116* 103* 93*     Results from last 7 days   Lab Units 21  1389 09/07/21  0506 09/06/21  0609 09/05/21  0651   SODIUM mmol/L 137 137 136 136   POTASSIUM mmol/L 4 2 4 1 4 5 4 6   CHLORIDE mmol/L 104 105 104 104   CO2 mmol/L 24 24 23 22   BUN mg/dL 16 12 11 12   CREATININE mg/dL 0 95 0 94 0 91 0 96   EGFR ml/min/1 73sq m 79 80 83 78   CALCIUM mg/dL 9 2 9 0 9 3 8 6   AST U/L  --  33 52* 87*   ALT U/L  --  141* 178* 224*   ALK PHOS U/L  --  105 106 108     Results from last 7 days   Lab Units 09/06/21  1258 09/06/21  1257 09/03/21  1338 09/03/21  1149   BLOOD CULTURE  No Growth at 24 hrs  No Growth at 24 hrs  No Growth After 4 Days   Enterobacter aerogenes*   GRAM STAIN RESULT   --   --   --  Gram negative rods*     Results from last 7 days   Lab Units 09/04/21  0451 09/03/21  1659   PROCALCITONIN ng/ml 0 91* 1 39*

## 2021-09-08 NOTE — ASSESSMENT & PLAN NOTE
Patient does have 1/2 blood cultures positive for Enterobacter aerogenous  Apparently this is sensitive to aztreonam     Likely from intra-abdominal source  MRCP performed yesterday showed choledocholithiasis  Tentative plan for ERCP  Will need gallbladder upper for discharge  Appreciate ID and GI recommendations  Continue levofloxacin for now

## 2021-09-08 NOTE — ANESTHESIA PREPROCEDURE EVALUATION
Procedure:  ERCP    Relevant Problems   CARDIO   (+) CAD (coronary artery disease)      ENDO   (+) DM (diabetes mellitus), type 2 (HCC)   (+) Hypothyroidism      /RENAL   (+) Benign prostatic hyperplasia without lower urinary tract symptoms   (+) CKD (chronic kidney disease) stage 3, GFR 30-59 ml/min (HCC)   (+) Horseshoe kidney with renal calculus      NEURO/PSYCH   (+) History of endocarditis      Other   (+) Choledocholithiasis   (+) Elevated troponin   (+) Gram-negative bacteremia   (+) Hyperlipidemia associated with type 2 diabetes mellitus (HCC)      Choledocholithiasis  Gram-negative bacteremia  · Mildly elevated troponin with flat curve is likely non MI troponin elevation  Patient denies any chest pain  · 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  · Lactate normalized  Results for Loretta Runner (MRN [id number]) as of 9/8/2021 12:04   Ref  Range 9/8/2021 06:27   Platelet Count Latest Ref Range: 149 - 390 Thousands/uL 116 (L)     Physical Exam    Airway    Mallampati score: II  TM Distance: >3 FB  Neck ROM: full     Dental   Comment: Denies loose teeth,     Cardiovascular  Cardiovascular exam normal    Pulmonary  Pulmonary exam normal     Other Findings  Portions of exam deferred due to low yield and/or unknown COVID status      Anesthesia Plan  ASA Score- 3     Anesthesia Type- general with ASA Monitors  Additional Monitors:   Airway Plan: ETT  Plan Factors-Exercise tolerance (METS): >4 METS  Chart reviewed  Existing labs reviewed  Patient summary reviewed  Patient is not a current smoker  Induction- intravenous  Postoperative Plan-     Informed Consent- Anesthetic plan and risks discussed with patient  I personally reviewed this patient with the CRNA  Discussed and agreed on the Anesthesia Plan with the CRNA  Clayton Douglass

## 2021-09-08 NOTE — ASSESSMENT & PLAN NOTE
Lab Results   Component Value Date    EGFR 79 09/08/2021    EGFR 80 09/07/2021    EGFR 83 09/06/2021    CREATININE 0 95 09/08/2021    CREATININE 0 94 09/07/2021    CREATININE 0 91 09/06/2021     · Patient with history of horseshoe kidney  · Creatinine at baseline currently; baseline appears to be 1-1 2  · Continue to monitor

## 2021-09-08 NOTE — CONSULTS
Consultation - General Surgery   Juan Manuel Spearing 78 y o  male MRN: [id number]  Unit/Bed#: -Susan Encounter: [id number]    ASSESSMENT:  78year old male presenting with:  Choledocholithiasis  Transaminitis, resolving   Sepsis, resolved  Gram negative bacteremia, Enterococcus aerogenes   Cholelithiasis   Hepatic steatosis   Liver hemangiomas  Horseshoe kidney  Elevated Troponins, resolved   Thrombocytopenia   -9/3 CTAP: Mild bronchial wall thickening, right basilar subsegmental atelectasis, possible liver hemangiomas, fatty liver changes, horseshoe kidney  -9/4 RUQ US: Cholelithasis, hepatomegaly with fatty liver changes, prominent CBD  -9/7 MRCP: Choledocholithiasis with dilated CBD, 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)  -LFTs down trending since admission  -1/2 BC obtained on 9/3  + for Enterobacter aerogenes  -Sepsis resolved: Afebrile, VSS, no leukocytosis (wbc 6 42), LA normal  Source undefined   -Thrombocytopenia, 116  88 on admission  No signs of bleeding, stable   -Underwent ERCP today 9/8: sphincterotomy and ampullary dilation, multiple pigmented non-obstructing stones in the CBD with sludge present, largest stone measuring 10 mm, one large pigmented stone could not be removed and a 7 [place] by 5 cm stent was placed  Diabetes mellitus, type 2  Hyperlipidemia  Hypertension  Hx endocarditis, s/p bioprosthetic aortic heart valve 2013  Coronary artery disease  Chronic kidney disease, stage 3, GFR 30-59  Benign prostatic hyperplasia   Arthritis s/p knee joint replacements   Lumbar radiculopathy, Spondylolisthesis, s/p L2 L5 posterior decompression and L2-S1 posterior fusion  Essential tremor      PLAN:  -Possible laparoscopic cholecystectomy this admission vs interval lap rito in a few weeks after medically stable, which may be completed by surgeon at home in [place]  -Patient will need a cardiology consult for pre-op evaluation and risk stratification   -Full liquids, ADAT  -Analgesia, antiemetics prn  -DVT ppx, SQH and SCDs  -IV abx per SLIM  -FU repeat blood cx  -Trend fever curve, wbc, lfts  -Serial abdominal exams   -GI following, underwent ERCP today (see results above), plan for ERCP with stone removal and stent removal in 6-8 weeks   -Rest of medical management per primary medical team        _______________________________________________________________  Physician Requesting Consult: Froilan Garcia MD  Additional consultants: Marvin Resendiz, ID, Pharmacy, GI    Reason for Consult / Principal Problem:  Cholelithiasis, Choledocholithiasis,       SUBJECTIVE:  CC: Fevers, chills, rigors, diarrhea, RUQ pain began 6 days ago    HPI: Gino Herrera is a 78y o  year old male residing in [place], visiting his sister in the area presented when he began to develop fever/chills/rigors, cough, and diarrhea in which he decided to seek medical treatment at [facility] ED   Upon diagnostic workup found to have sepsis, transaminitis, elevated troponins, and thrombocytopenia  Over the next few days had hepatobiliary workup found to have  hepatic steatosis, liver hemangiomas, cholelithasis, and choledocolithiasis with dilated CBD, underwent ERCP today for stone removal, spichterotomy, ampullary dilation, and stent placement  At present denies any abdominal symptoms; no n/v/d/c, RUQ abdominal pain mostly resolved  Denies chest pains, SOB, subjective fevers/chills, or any urinary symptoms  Previous surgeries include AV valve replacement 2013, bilateral joint knee replacement, back surgery, and appendectomy at age 18  Anaphylaxis to PCN and Cephalosporins  Denies any home blood thinners  No problems with anesthesia or any pain medications in the past  Denies hx of biliary colic  Review of Systems:  Review of Systems   Constitutional: Positive for chills (rigors, POA) and fever (POA)  Negative for appetite change  HENT: Negative  Eyes: Negative  Respiratory: Positive for cough   Negative for shortness of breath  Cardiovascular: Negative for chest pain and palpitations  Gastrointestinal: Positive for abdominal pain (RUQ ) and diarrhea (POA, resolved)  Negative for blood in stool, constipation, nausea and vomiting  Genitourinary: Positive for hematuria  Negative for difficulty urinating, dysuria, frequency and urgency  Musculoskeletal: Negative  Skin: Negative for rash  Neurological: Positive for tremors (per wife, hx tremors)  Negative for weakness, numbness and headaches  Hematological: Negative  Psychiatric/Behavioral: Negative  Historical Information   Past Medical History:   Diagnosis Date    Arthritis     Diabetes mellitus ([facility] )     Disease of thyroid gland     Heart murmur     Hypertension     MI (myocardial infarction) ([facility] )     Renal disorder      Past Surgical History:   Procedure Laterality Date    BACK SURGERY      CARDIAC SURGERY      JOINT REPLACEMENT       Social History   Social History     Substance and Sexual Activity   Alcohol Use Never     Social History     Substance and Sexual Activity   Drug Use Never     Social History     Tobacco Use   Smoking Status Never Smoker   Smokeless Tobacco Never Used         Family History: History reviewed  No pertinent family history  Meds/Allergies   Home meds:   Prior to Admission medications    Medication Sig Start Date End Date Taking?  Authorizing Provider   Alogliptin Benzoate 25 MG TABS Take 25 mg by mouth   Yes Historical Provider, MD   aspirin 81 mg chewable tablet Chew 81 mg daily   Yes Historical Provider, MD   cholecalciferol (VITAMIN D3) 1,000 units tablet Take 1,000 Units by mouth 2 (two) times a day   Yes Historical Provider, MD   gabapentin (NEURONTIN) 800 mg tablet Take 900 mg by mouth 3 (three) times a day   Yes Historical Provider, MD   insulin aspart (NovoLOG) 100 units/mL injection Inject 12 Units under the skin daily before dinner   Yes Historical Provider, MD   insulin aspart protamine-insulin aspart (NovoLOG 70/30) 100 units/mL injection Inject 22 Units under the skin daily   Yes Historical Provider, MD   insulin glargine (LANTUS) 100 units/mL subcutaneous injection Inject 28 Units under the skin daily at bedtime   Yes Historical Provider, MD   levothyroxine 50 mcg tablet Take 50 mcg by mouth daily   Yes Historical Provider, MD   loratadine (CLARITIN) 10 mg tablet Take 10 mg by mouth daily   Yes Historical Provider, MD   meloxicam (MOBIC) 7 5 mg tablet Take 7 5 mg by mouth as needed   Yes Historical Provider, MD   metFORMIN (GLUCOPHAGE) 500 mg tablet Take 500 mg by mouth 2 (two) times a day with meals   Yes Historical Provider, MD   pramipexole (MIRAPEX) 1 5 MG tablet Take 1 5 mg by mouth 2 (two) times a day   Yes Historical Provider, MD   pravastatin (PRAVACHOL) 40 mg tablet Take 40 mg by mouth daily   Yes Historical Provider, MD   propranolol (INDERAL) 60 mg tablet Take 60 mg by mouth daily   Yes Historical Provider, MD   sildenafil (VIAGRA) 100 mg tablet Take 100 mg by mouth daily as needed for erectile dysfunction   Yes Historical Provider, MD   tamsulosin (FLOMAX) 0 4 mg Take 0 4 mg by mouth 2 (two) times a day   Yes Historical Provider, MD   TiZANidine (ZANAFLEX) 4 MG capsule Take 4 mg by mouth as needed for muscle spasms   Yes Historical Provider, MD   traMADol (ULTRAM) 50 mg tablet Take 50 mg by mouth every 6 (six) hours as needed for moderate pain   Yes Historical Provider, MD   vitamin B-12 (VITAMIN B-12) 1,000 mcg tablet Take by mouth daily   Yes Historical Provider, MD     Scheduled Meds:  Current Facility-Administered Medications   Medication Dose Route Frequency Provider Last Rate    acetaminophen  650 mg Oral Q6H PRN Jovanni Meléndez,       aspirin  81 mg Oral Daily Jovanni Meléndez, DO      Diclofenac Sodium  2 g Topical 4x Daily Marvin Ellington MD      gabapentin  800 mg Oral TID Jovanni Meléndez DO      guaiFENesin  600 mg Oral Q12H [address], MD      heparin (porcine)  5,000 Units Subcutaneous [facility] Pérez Liu PA-C      HYDROmorphone  0 5 mg Intravenous Q6H PRN Ifrah Wilkinson MD      insulin aspart protamine-insulin aspart  22 Units Subcutaneous Daily Alvin Baptise, DO      insulin glargine  28 Units Subcutaneous HS Alvin Baptise, DO      insulin lispro  1-5 Units Subcutaneous HS Alvin Baptise, DO      insulin lispro  12 Units Subcutaneous Daily With Colby Sextono, DO      insulin lispro  2-12 Units Subcutaneous TID [facility] Alvin Baptise, DO      iohexol   Injection Code/Trauma/Sedation Med Malka Dc MD      levofloxacin  750 mg Oral Q24H Rena Umanzor PA-C      levothyroxine  50 mcg Oral Daily Alvin Baptise, DO      loratadine  10 mg Oral Daily Alvin Baptise, DO      metroNIDAZOLE  500 mg Intravenous Q8H Alvin Baptise,  mg (09/08/21 0535)    oxyCODONE  5 mg Oral Q4H PRN Alvin Baptise, DO      pramipexole  1 5 mg Oral BID Alvin Baptise, DO      propranolol  60 mg Oral Daily Alvin Baptise, DO      tamsulosin  0 4 mg Oral Daily With Bowman José Miguel Cianciolo, DO       Continuous Infusions:   PRN Meds:    acetaminophen    HYDROmorphone    iohexol    oxyCODONE  ALLERGIES:   Allergies   Allergen Reactions    Cefazolin Anaphylaxis    Penicillins Anaphylaxis    Accupril [Quinapril Hcl] Other (See Comments)     unknown    Quinapril Other (See Comments)     unknown           Objective   Vitals:  Blood pressure (P) 144/68, pulse (P) 60, temperature (P) 97 6 °F (36 4 °C), resp  rate (P) 16, height 6' 3" (1 905 m), weight 128 kg (281 lb 4 9 oz), SpO2 (P) 99 %  Body mass index is 35 16 kg/m²  I/Os:  I/O       09/06 0701 - 09/07 0700 09/07 0701 - 09/08 0700 09/08 0701 - 09/09 0700    P  O   600 0    I V  (mL/kg)   500 (3 9)    Total Intake(mL/kg)  600 (4 7) 500 (3 9)    Urine (mL/kg/hr) 3600 (1 2) 300 (0 1) 200 (0 2)    Total Output 3600 300 200    Net -3600 +300 +300                 Invasive Lines/Tubes:  Invasive Devices     Peripheral Intravenous Line            Peripheral IV 09/07/21 Dorsal (posterior); Left Forearm <1 day          Drain            External Urinary Catheter Small 3 days                  Physical Exam  Vitals reviewed  Constitutional:       General: He is not in acute distress  Comments: 78year old well-nourished, well-developed male, seated upright in bedside chair comfortably with wife and sister at bedside   HENT:      Head: Normocephalic and atraumatic  Nose: No rhinorrhea  Mouth/Throat:      Mouth: Mucous membranes are moist       Pharynx: Oropharynx is clear  Eyes:      General: No scleral icterus  Extraocular Movements: Extraocular movements intact  Conjunctiva/sclera: Conjunctivae normal       Pupils: Pupils are equal, round, and reactive to light  Cardiovascular:      Rate and Rhythm: Normal rate and regular rhythm  Heart sounds: No murmur (aortic area) heard  Pulmonary:      Effort: Pulmonary effort is normal       Breath sounds: No wheezing, rhonchi or rales  Abdominal:      General: There is no distension  Tenderness: There is no abdominal tenderness  There is no guarding or rebound  Comments: Previous surgical scars noted  Active bowel sounds    Musculoskeletal:         General: No swelling or tenderness  Cervical back: Normal range of motion and neck supple  Right lower leg: No edema  Left lower leg: No edema  Comments: SCDs in place  Knee joint replacement scars   Skin:     General: Skin is warm and dry  Coloration: Skin is not jaundiced  Findings: No rash  Neurological:      General: No focal deficit present  Mental Status: He is alert and oriented to person, place, and time  Psychiatric:         Mood and Affect: Mood normal          Thought Content:  Thought content normal          Judgment: Judgment normal            Lab Results and Cultures:   CBC: Results from last 7 days   Lab Units 09/08/21  0627 09/07/21  0506 09/06/21  0610 09/05/21  0651 09/04/21  0642   WBC Thousand/uL 6 42 4 85 5 25 4 53 5 43   HEMOGLOBIN g/dL 14 5 14 9 14 2 15 1 13 9   HEMATOCRIT % 43 9 45 0 43 6 45 5 42 9   PLATELETS Thousands/uL 116* 103* 93* 83* 78*     BMP/CMP:  Results from last 7 days   Lab Units 09/08/21  0627 09/07/21  0506 09/06/21  0609 09/05/21  0651 09/04/21  0451   POTASSIUM mmol/L 4 2 4 1 4 5 4 6 3 7   CHLORIDE mmol/L 104 105 104 104 105   CO2 mmol/L 24 24 23 22 22   BUN mg/dL 16 12 11 12 14   CREATININE mg/dL 0 95 0 94 0 91 0 96 1 14   CALCIUM mg/dL 9 2 9 0 9 3 8 6 8 4     Coags:   Results from last 7 days   Lab Units 09/03/21  1659   INR  1 18     Lipid panel:     HgbA1c: No results found for: HGBA1C  Urinalysis:   Lab Results   Component Value Date    COLORU Yellow 09/04/2021    CLARITYU Clear 09/04/2021    SPECGRAV 1 020 09/04/2021    PHUR 5 5 09/04/2021    LEUKOCYTESUR Negative 09/04/2021    NITRITE Negative 09/04/2021    GLUCOSEU Negative 09/04/2021    KETONESU Negative 09/04/2021    BILIRUBINUR Negative 09/04/2021    BLOODU Trace-Intact (A) 09/04/2021   ,   Urine Culture: No results found for: URINECX  Wound Culure: No results found for: WOUNDCULT  Blood Culture:   Lab Results   Component Value Date    BLOODCX No Growth at 24 hrs  09/06/2021         Imaging Studies: I have personally reviewed pertinent reports  ERCP Fluoro    Result Date: 9/8/2021  Impression: Sphincterotomy and ampullary dilation with removal of multiple pigmented stones; unable to remove 1 large pigmented stone 7 [place] by 5 cm stent placement RECOMMENDATION: Laparoscopic cholecystectomy per surgery ERCP with stone removal and stent removal in 6-8 weeks Follow LFTs normal Continue antibiotics Mae Mai MD    XR chest 1 view portable    Result Date: 9/3/2021  Impression: Status post cardiac surgery No acute cardiopulmonary disease   Workstation performed: [id number]     XR hip/pelv 2-3 vws left if performed    Result Date: 9/7/2021  Impression: No acute osseous abnormality  Workstation performed: [id number]     XR knee 1 or 2 vw left    Result Date: 9/7/2021  Impression: No acute osseous abnormality  Workstation performed: [id number]     CT chest abdomen pelvis w contrast    Result Date: 9/3/2021  Impression: 1  Mild bronchial wall thickening, which may related to small airwas inflammation  Right basilar subsegmental atelectasis also present  2   No acute abnormality within the abdomen or pelvis  3   Several enhancing foci within the posterior right hepatic lobe, possibly representing hemangiomas  Further characterization with nonemergent liver MRI recommended  4   Horseshoe kidney  The study was marked in [facility] for immediate notification  Workstation performed: [id number]     MRI abdomen w wo contrast and mrcp    Result Date: 9/8/2021  Impression: Choledocholithiasis with dilated common bile duct Mildly dilated persistent duct of Santorini which opens at the minor papilla, 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 Hepatic steatosis Small enhancing lesions with persistent enhancement in the liver compatible with hemangioma Workstation performed: [id number]     US right upper quadrant with liver dopplers    Result Date: 9/4/2021  Impression: Hepatomegaly with fatty infiltration  Normal evaluation of the hepatic vasculature  Cholelithiasis with no evidence of acute cholecystitis  Prominent common bile duct, however no obstructing lesions or intrahepatic biliary dilatation identified  If clinical concern remains, consider MRCP for further evaluation  Workstation performed: [id number]      EKG, Pathology, and Other Studies: I have personally reviewed pertinent reports      VTE Prophylaxis: Heparin     Code Status: Level 3 - DNAR and DNI  Advance Directive and Living Will:      Power of :    POLST:      Counseling / Coordination of Care  Counseling/Coordination of Care: Total floor / unit time spent today 20 minutes  Greater than 50% of total time was spent with the patient and / or family counseling and / or coordination of care  A description of the counseling / coordination of care: Discussed dx/tx plan with patient, patient's wife and sister, and attending physician         TATA Berry  9/8/2021

## 2021-09-08 NOTE — ANESTHESIA POSTPROCEDURE EVALUATION
Post-Op Assessment Note    CV Status:  Stable  Pain Score: 1    Pain management: adequate     Mental Status:  Alert and awake   Hydration Status:  Euvolemic   PONV Controlled:  Controlled   Airway Patency:  Patent      Post Op Vitals Reviewed: Yes      Staff: CRNA         No complications documented      BP (P) 144/68 (09/08/21 1325)    Temp (P) 97 6 °F (36 4 °C) (09/08/21 1325)    Pulse (P) 60 (09/08/21 1325)   Resp (P) 16 (09/08/21 1325)    SpO2 (P) 99 % (09/08/21 1325)

## 2021-09-08 NOTE — PROGRESS NOTES
Reviewed results of MRI/MRCP with the patient  He has several hepatic hemangiomas as well as CBD dilatation with multiple stones in the CBD  Will plan for ERCP today with sphincterotomy and stone removal  Pt has been NPO since midnight  Discussed risk of pancreatitis with patient and he understands  Will consult the surgical team for cholecystectomy during this admission given cholelithiasis/choledocholithiasis

## 2021-09-08 NOTE — ASSESSMENT & PLAN NOTE
· Patient stated on admission that he woke up with fever and rigors the morning of admission and did state he had an episode of diarrhea today    Patient denies eating anything out of the ordinary yesterday and denies eating any shellfish or undercooked meats  · CT chest abdomen pelvis-mild bronchial wall thickening; no acute abnormality within abdomen or pelvis; several enhancing foci within posterior right hepatic lobe possibly representing hemangiomas; horseshoe kidney  · Chest x-ray-no acute cardiopulmonary disease  · Lactic acid 2 3  · WBC count normal    Possibilities include tracheobronchitis with transient bacteremia, infectious diarrhea/viral now resolved, or more concerning left knee prosthetic joint infection - although this now appears less likely as per Orthopedics    Plan  · Plan as outlined above

## 2021-09-08 NOTE — PROGRESS NOTES
[address] [facility]  Progress Note Leigha Murrieta 1945, 78 y o  male MRN: [id number]  Unit/Bed#: -Susan Encounter: [id number]  Primary Care Provider: Apple Benedict   Date and time admitted to hospital: 9/3/2021 11:36 AM    Choledocholithiasis  Assessment & Plan  Noted on MRCP  Plan for ERCP  Will need gallbladder surgery prior to discharge  Appreciate GI, surgery recommendations    Gram-negative bacteremia  Assessment & Plan  Patient does have 1/2 blood cultures positive for Enterobacter aerogenous  Apparently this is sensitive to aztreonam     Likely from intra-abdominal source  MRCP performed yesterday showed choledocholithiasis  Tentative plan for ERCP  Will need gallbladder upper for discharge  Appreciate ID and GI recommendations  Continue levofloxacin for now      DM (diabetes mellitus), type 2 [facility])  Assessment & Plan  No results found for: HGBA1C    Recent Labs     09/07/21  1201 09/07/21  1643 09/07/21  2111 09/08/21  0757   POCGLU 263* 265* 146* 160*       Blood Sugar Average: Last 72 hrs:  · (P) 189 [id number] hold oral antihyperglycemics    · Correctional scale insulin  · Glargine 28 units at night  · lispro 12 units with dinner  · NovoLog 70/3022 units in the morning      Elevated troponin  Assessment & Plan  · Mildly elevated troponin with flat curve is likely non MI troponin elevation  Patient denies any chest pain  Hyperlipidemia associated with type 2 diabetes mellitus ([address] )  Assessment & Plan  Given markedly elevated AST and ALT will hold statin at this time      Horseshoe kidney with renal calculus  Assessment & Plan  Plan is under CKD    History of endocarditis  Assessment & Plan  · 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       Given history of endocarditis and bioprosthetic aortic valve if persistent bacteremia consider echocardiogram      CKD (chronic kidney disease) stage 3, GFR 30-59 ml/min ([facility])  Assessment & Plan  Lab Results   Component Value Date    EGFR 79 09/08/2021    EGFR 80 09/07/2021    EGFR 83 09/06/2021    CREATININE 0 95 09/08/2021    CREATININE 0 94 09/07/2021    CREATININE 0 91 09/06/2021     · Patient with history of horseshoe kidney  · Creatinine at baseline currently; baseline appears to be 1-1 2  · Continue to monitor    Benign prostatic hyperplasia without lower urinary tract symptoms  Assessment & Plan  Continue tamsulosin    CAD (coronary artery disease)  Assessment & Plan  · Stable at this time  · Continue propranolol 60 mg daily        * Sepsis ([address] )  Assessment & Plan  · Patient stated on admission that he woke up with fever and rigors the morning of admission and did state he had an episode of diarrhea today  Patient denies eating anything out of the ordinary yesterday and denies eating any shellfish or undercooked meats  · CT chest abdomen pelvis-mild bronchial wall thickening; no acute abnormality within abdomen or pelvis; several enhancing foci within posterior right hepatic lobe possibly representing hemangiomas; horseshoe kidney  · Chest x-ray-no acute cardiopulmonary disease  · Lactic acid 2 3  · WBC count normal    Possibilities include tracheobronchitis with transient bacteremia, infectious diarrhea/viral now resolved, or more concerning left knee prosthetic joint infection - although this now appears less likely as per Orthopedics    Plan  · Plan as outlined above      VTE Pharmacologic Prophylaxis:   Pharmacologic: Heparin  Mechanical VTE Prophylaxis in Place: Yes    Patient Centered Rounds: I have performed bedside rounds with nursing staff today  Discussions with Specialists or Other Care Team Provider: cm, nursing    Education and Discussions with Family / Patient: pt    Time Spent for Care: 30 minutes  More than 50% of total time spent on counseling and coordination of care as described above      Current Length of Stay: 5 day(s)    Current Patient Status: Inpatient Certification Statement: The patient will continue to require additional inpatient hospital stay due to see above    Discharge Plan: see above    Code Status: Level 3 - DNAR and DNI      Subjective:   No acute compalints    Objective:     Vitals:   Temp (24hrs), Av 6 °F (36 4 °C), Min:97 3 °F (36 3 °C), Max:98 °F (36 7 °C)    Temp:  [97 3 °F (36 3 °C)-98 °F (36 7 °C)] 98 °F (36 7 °C)  HR:  [60-63] 60  Resp:  [18] 18  BP: (154-163)/(77-79) 163/77  SpO2:  [93 %-96 %] 96 %  Body mass index is 35 16 kg/m²  Input and Output Summary (last 24 hours): Intake/Output Summary (Last 24 hours) at 2021 0959  Last data filed at 2021 0851  Gross per 24 hour   Intake 360 ml   Output 500 ml   Net -140 ml       Physical Exam:     Physical Exam  Constitutional:       General: He is not in acute distress  Appearance: He is well-developed  He is not diaphoretic  HENT:      Head: Normocephalic and atraumatic  Nose: Nose normal       Mouth/Throat:      Pharynx: No oropharyngeal exudate  Eyes:      General: No scleral icterus  Conjunctiva/sclera: Conjunctivae normal    Cardiovascular:      Rate and Rhythm: Normal rate and regular rhythm  Heart sounds: Normal heart sounds  No murmur heard  No friction rub  No gallop  Pulmonary:      Effort: Pulmonary effort is normal  No respiratory distress  Breath sounds: Normal breath sounds  No wheezing or rales  Chest:      Chest wall: No tenderness  Abdominal:      General: Bowel sounds are normal  There is no distension  Palpations: Abdomen is soft  Tenderness: There is no abdominal tenderness  There is no guarding  Musculoskeletal:         General: No tenderness or deformity  Normal range of motion  Cervical back: Normal range of motion and neck supple  Skin:     General: Skin is warm and dry  Findings: No erythema  Neurological:      Mental Status: He is alert  Mental status is at baseline           Additional Data: Labs:    Results from last 7 days   Lab Units 09/08/21  0627   WBC Thousand/uL 6 42   HEMOGLOBIN g/dL 14 5   HEMATOCRIT % 43 9   PLATELETS Thousands/uL 116*   NEUTROS PCT % 63   LYMPHS PCT % 15   MONOS PCT % 9   EOS PCT % 11*     Results from last 7 days   Lab Units 09/08/21  0627 09/07/21  0506   SODIUM mmol/L 137 137   POTASSIUM mmol/L 4 2 4 1   CHLORIDE mmol/L 104 105   CO2 mmol/L 24 24   BUN mg/dL 16 12   CREATININE mg/dL 0 95 0 94   ANION GAP mmol/L 9 8   CALCIUM mg/dL 9 2 9 0   ALBUMIN g/dL  --  2 9*   TOTAL BILIRUBIN mg/dL  --  0 45   ALK PHOS U/L  --  105   ALT U/L  --  141*   AST U/L  --  33   GLUCOSE RANDOM mg/dL 169* 195*     Results from last 7 days   Lab Units 09/03/21  1659   INR  1 18     Results from last 7 days   Lab Units 09/08/21  0757 09/07/21  2111 09/07/21  1643 09/07/21  1201 09/07/21  0751 09/06/21  2158 09/06/21  1647 09/06/21  1116 09/06/21  0710 09/05/21  2059 09/05/21  1624 09/05/21  0722   POC GLUCOSE mg/dl 160* 146* 265* 263* 203* 321* 249* 219* 201* 261* 233* 165*         Results from last 7 days   Lab Units 09/04/21  0451 09/03/21  1659 09/03/21  1338 09/03/21  1149   LACTIC ACID mmol/L  --  1 9 2 3* 2 2*   PROCALCITONIN ng/ml 0 91* 1 39*  --   --            * I Have Reviewed All Lab Data Listed Above  * Additional Pertinent Lab Tests Reviewed: All Labs Within Last 24 Hours Reviewed    Imaging:    Imaging Reports Reviewed Today Include: na  Imaging Personally Reviewed by Myself Includes:  na    Recent Cultures (last 7 days):     Results from last 7 days   Lab Units 09/06/21  1258 09/06/21  1257 09/03/21  1338 09/03/21  1149   BLOOD CULTURE  No Growth at 24 hrs  No Growth at 24 hrs  No Growth After 4 Days   Enterobacter aerogenes*   GRAM STAIN RESULT   --   --   --  Gram negative rods*       Last 24 Hours Medication List:   Current Facility-Administered Medications   Medication Dose Route Frequency Provider Last Rate    acetaminophen  650 mg Oral Q6H PRN Chalino Otoole DO  aspirin  81 mg Oral Daily Cathdonaldo Pelletier, DO      Diclofenac Sodium  2 g Topical 4x Daily Laith Su MD      gabapentin  800 mg Oral TID Dagoberto Pelletier, DO      guaiFENesin  600 mg Oral Q12H [address] Laith Su MD      heparin (porcine)  5,000 Units Subcutaneous [place], [place]      HYDROmorphone  0 5 mg Intravenous Q6H PRN Laith Su MD      insulin aspart protamine-insulin aspart  22 Units Subcutaneous Daily Cathdonaldo Masonst, DO      insulin glargine  28 Units Subcutaneous HS [place] Masonst, DO      insulin lispro  1-5 Units Subcutaneous HS Dagoberto Pelletier, DO      insulin lispro  12 Units Subcutaneous Daily With Bowman Martiniquais Cianciol, DO      insulin lispro  2-12 Units Subcutaneous TID [facility] [place] Masonst, DO      levofloxacin  750 mg Oral Q24H Marianna Dai PA-C      levothyroxine  50 mcg Oral Daily Cathdonaldo Pelletier, DO      loratadine  10 mg Oral Daily Dagoberto Pelletier, DO      metroNIDAZOLE  500 mg Intravenous Q8H Cathdonaldo Pelletier,  mg (09/08/21 0535)    oxyCODONE  5 mg Oral Q4H PRN Dagoberto Pelletier, DO      pramipexole  1 5 mg Oral BID Dagoberto Pelletier, DO      propranolol  60 mg Oral Daily Cathdonaldo Pelletier, DO      tamsulosin  0 4 mg Oral Daily With Dinner Dagoberto Pelletier,           Today, Patient Was Seen By: Misti Benson MD    ** Please Note: Dictation voice to text software may have been used in the creation of this document   **

## 2021-09-08 NOTE — ASSESSMENT & PLAN NOTE
No results found for: HGBA1C    Recent Labs     09/07/21  1201 09/07/21  1643 09/07/21  2111 09/08/21  0757   POCGLU 263* 265* 146* 160*       Blood Sugar Average: Last 72 hrs:  · (P) 587 [id number] hold oral antihyperglycemics    · Correctional scale insulin  · Glargine 28 units at night  · lispro 12 units with dinner  · NovoLog 70/3022 units in the morning

## 2021-09-08 NOTE — ASSESSMENT & PLAN NOTE
Noted on MRCP  Plan for ERCP  Will need gallbladder surgery prior to discharge  Appreciate GI, surgery recommendations

## 2021-09-09 LAB
A PHAGOCYTOPH DNA BLD QL NAA+PROBE: NEGATIVE
ALBUMIN SERPL BCP-MCNC: 3.4 G/DL (ref 3.5–5)
ALP SERPL-CCNC: 100 U/L (ref 46–116)
ALT SERPL W P-5'-P-CCNC: 104 U/L (ref 12–78)
ANION GAP SERPL CALCULATED.3IONS-SCNC: 11 MMOL/L (ref 4–13)
AST SERPL W P-5'-P-CCNC: 33 U/L (ref 5–45)
BASOPHILS # BLD AUTO: 0.04 THOUSANDS/ΜL (ref 0–0.1)
BASOPHILS NFR BLD AUTO: 0 % (ref 0–1)
BILIRUB DIRECT SERPL-MCNC: 0.15 MG/DL (ref 0–0.2)
BILIRUB SERPL-MCNC: 0.48 MG/DL (ref 0.2–1)
BUN SERPL-MCNC: 18 MG/DL (ref 5–25)
CALCIUM SERPL-MCNC: 9.7 MG/DL (ref 8.3–10.1)
CHLORIDE SERPL-SCNC: 102 MMOL/L (ref 100–108)
CO2 SERPL-SCNC: 23 MMOL/L (ref 21–32)
CREAT SERPL-MCNC: 1 MG/DL (ref 0.6–1.3)
EOSINOPHIL # BLD AUTO: 0.02 THOUSAND/ΜL (ref 0–0.61)
EOSINOPHIL NFR BLD AUTO: 0 % (ref 0–6)
ERYTHROCYTE [DISTWIDTH] IN BLOOD BY AUTOMATED COUNT: 14 % (ref 11.6–15.1)
GFR SERPL CREATININE-BSD FRML MDRD: 74 ML/MIN/1.73SQ M
GLUCOSE SERPL-MCNC: 167 MG/DL (ref 65–140)
GLUCOSE SERPL-MCNC: 191 MG/DL (ref 65–140)
GLUCOSE SERPL-MCNC: 221 MG/DL (ref 65–140)
GLUCOSE SERPL-MCNC: 234 MG/DL (ref 65–140)
GLUCOSE SERPL-MCNC: 316 MG/DL (ref 65–140)
HCT VFR BLD AUTO: 43.6 % (ref 36.5–49.3)
HGB BLD-MCNC: 14.5 G/DL (ref 12–17)
IMM GRANULOCYTES # BLD AUTO: 0.1 THOUSAND/UL (ref 0–0.2)
IMM GRANULOCYTES NFR BLD AUTO: 1 % (ref 0–2)
LYMPHOCYTES # BLD AUTO: 0.98 THOUSANDS/ΜL (ref 0.6–4.47)
LYMPHOCYTES NFR BLD AUTO: 10 % (ref 14–44)
MCH RBC QN AUTO: 28.1 PG (ref 26.8–34.3)
MCHC RBC AUTO-ENTMCNC: 33.3 G/DL (ref 31.4–37.4)
MCV RBC AUTO: 85 FL (ref 82–98)
MONOCYTES # BLD AUTO: 0.54 THOUSAND/ΜL (ref 0.17–1.22)
MONOCYTES NFR BLD AUTO: 5 % (ref 4–12)
NEUTROPHILS # BLD AUTO: 8.46 THOUSANDS/ΜL (ref 1.85–7.62)
NEUTS SEG NFR BLD AUTO: 84 % (ref 43–75)
NRBC BLD AUTO-RTO: 0 /100 WBCS
PLATELET # BLD AUTO: 122 THOUSANDS/UL (ref 149–390)
PMV BLD AUTO: 9.7 FL (ref 8.9–12.7)
POTASSIUM SERPL-SCNC: 4.2 MMOL/L (ref 3.5–5.3)
PROT SERPL-MCNC: 7.3 G/DL (ref 6.4–8.2)
RBC # BLD AUTO: 5.16 MILLION/UL (ref 3.88–5.62)
SODIUM SERPL-SCNC: 136 MMOL/L (ref 136–145)
WBC # BLD AUTO: 10.14 THOUSAND/UL (ref 4.31–10.16)

## 2021-09-09 PROCEDURE — 80048 BASIC METABOLIC PNL TOTAL CA: CPT | Performed by: INTERNAL MEDICINE

## 2021-09-09 PROCEDURE — 99232 SBSQ HOSP IP/OBS MODERATE 35: CPT | Performed by: SURGERY

## 2021-09-09 PROCEDURE — 85025 COMPLETE CBC W/AUTO DIFF WBC: CPT | Performed by: INTERNAL MEDICINE

## 2021-09-09 PROCEDURE — 99232 SBSQ HOSP IP/OBS MODERATE 35: CPT | Performed by: INTERNAL MEDICINE

## 2021-09-09 PROCEDURE — 82948 REAGENT STRIP/BLOOD GLUCOSE: CPT

## 2021-09-09 PROCEDURE — 80076 HEPATIC FUNCTION PANEL: CPT | Performed by: PHYSICIAN ASSISTANT

## 2021-09-09 RX ADMIN — DICLOFENAC SODIUM 2 G: 10 GEL TOPICAL at 22:00

## 2021-09-09 RX ADMIN — DICLOFENAC SODIUM 2 G: 10 GEL TOPICAL at 09:34

## 2021-09-09 RX ADMIN — INSULIN ASPART 22 UNITS: 100 INJECTION, SUSPENSION SUBCUTANEOUS at 09:35

## 2021-09-09 RX ADMIN — GUAIFENESIN 600 MG: 600 TABLET ORAL at 20:39

## 2021-09-09 RX ADMIN — TAMSULOSIN HYDROCHLORIDE 0.4 MG: 0.4 CAPSULE ORAL at 16:52

## 2021-09-09 RX ADMIN — INSULIN LISPRO 1 UNITS: 100 INJECTION, SOLUTION INTRAVENOUS; SUBCUTANEOUS at 21:58

## 2021-09-09 RX ADMIN — LEVOTHYROXINE SODIUM 50 MCG: 50 TABLET ORAL at 09:32

## 2021-09-09 RX ADMIN — PROPRANOLOL HYDROCHLORIDE 60 MG: 20 TABLET ORAL at 09:32

## 2021-09-09 RX ADMIN — INSULIN LISPRO 8 UNITS: 100 INJECTION, SOLUTION INTRAVENOUS; SUBCUTANEOUS at 12:18

## 2021-09-09 RX ADMIN — HYDROMORPHONE HYDROCHLORIDE 0.5 MG: 1 INJECTION, SOLUTION INTRAMUSCULAR; INTRAVENOUS; SUBCUTANEOUS at 20:39

## 2021-09-09 RX ADMIN — HEPARIN SODIUM 5000 UNITS: 5000 INJECTION INTRAVENOUS; SUBCUTANEOUS at 21:58

## 2021-09-09 RX ADMIN — LORATADINE 10 MG: 10 TABLET ORAL at 09:33

## 2021-09-09 RX ADMIN — GABAPENTIN 800 MG: 400 CAPSULE ORAL at 09:32

## 2021-09-09 RX ADMIN — INSULIN LISPRO 4 UNITS: 100 INJECTION, SOLUTION INTRAVENOUS; SUBCUTANEOUS at 16:53

## 2021-09-09 RX ADMIN — ASPIRIN 81 MG CHEWABLE TABLET 81 MG: 81 TABLET CHEWABLE at 09:32

## 2021-09-09 RX ADMIN — DICLOFENAC SODIUM 2 G: 10 GEL TOPICAL at 12:17

## 2021-09-09 RX ADMIN — OXYCODONE HYDROCHLORIDE 5 MG: 5 TABLET ORAL at 18:21

## 2021-09-09 RX ADMIN — GABAPENTIN 800 MG: 400 CAPSULE ORAL at 20:39

## 2021-09-09 RX ADMIN — INSULIN LISPRO 12 UNITS: 100 INJECTION, SOLUTION INTRAVENOUS; SUBCUTANEOUS at 16:55

## 2021-09-09 RX ADMIN — GUAIFENESIN 600 MG: 600 TABLET ORAL at 09:32

## 2021-09-09 RX ADMIN — HEPARIN SODIUM 5000 UNITS: 5000 INJECTION INTRAVENOUS; SUBCUTANEOUS at 06:14

## 2021-09-09 RX ADMIN — PRAMIPEXOLE DIHYDROCHLORIDE 1.5 MG: 0.5 TABLET ORAL at 18:19

## 2021-09-09 RX ADMIN — DICLOFENAC SODIUM 2 G: 10 GEL TOPICAL at 18:19

## 2021-09-09 RX ADMIN — LEVOFLOXACIN 750 MG: 500 TABLET, FILM COATED ORAL at 12:17

## 2021-09-09 RX ADMIN — HEPARIN SODIUM 5000 UNITS: 5000 INJECTION INTRAVENOUS; SUBCUTANEOUS at 14:28

## 2021-09-09 RX ADMIN — GABAPENTIN 800 MG: 400 CAPSULE ORAL at 16:52

## 2021-09-09 RX ADMIN — METRONIDAZOLE 500 MG: 500 INJECTION, SOLUTION INTRAVENOUS at 06:14

## 2021-09-09 RX ADMIN — INSULIN LISPRO 4 UNITS: 100 INJECTION, SOLUTION INTRAVENOUS; SUBCUTANEOUS at 09:36

## 2021-09-09 RX ADMIN — PRAMIPEXOLE DIHYDROCHLORIDE 1.5 MG: 0.5 TABLET ORAL at 09:32

## 2021-09-09 RX ADMIN — INSULIN GLARGINE 28 UNITS: 100 INJECTION, SOLUTION SUBCUTANEOUS at 21:58

## 2021-09-09 NOTE — PROGRESS NOTES
Progress Note - General Surgery   [address]is [address] 78 y o  male MRN: [id number]  Unit/Bed#: -01 Encounter: [id number]    Assessment/Plan  Enterobacter Bacteremia  Cholelithiasis with Choledocholithiasis  Elevated LFTs    -cont ABX for full course   -recommend to finish antibiotics due to having a prosthetic heart valve  Recommend interval cholecystectomy following cardiology clearance at home in [place]   -cont to monitor exam and blood work  -cont regular diet  -no surgical intervention during this admission   -discharge per SLIM with full course of antibiotics    Discussion with patient, his wife and Daughter  All questions answered to their satisfaction and agrees with plan  Chief Complaint: I am eating without nausea or pain,  I had gas that was relieved with belching  Objective/Exam: Blood pressure 139/74, pulse 59, temperature 97 6 °F (36 4 °C), resp  rate 18, height 6' 3" (1 905 m), weight 128 kg (281 lb 4 9 oz), SpO2 96 %  Wound Culure: No results found for: WOUNDCULT      General Appearance:    Alert and orientated x 3, cooperative, no distress   Lungs:     Clear to auscultation bilaterally, respirations unlabored    Heart:    Regular rate and rhythm   Abdomen:     Soft NT          Extremities:   Extremities normal,  no cyanosis or edema   Pulses:   2+ and symmetric all extremities, no calf tenderness   Skin:   Skin color, texture, turgor normal, no rashes or lesions   Neurologic:   CNII-XII intact, normal strength, sensation and reflexes     Throughout, affect appropriate       ,      Intake/Output Summary (Last 24 hours) at 9/9/2021 1326  Last data filed at 9/9/2021 1212  Gross per 24 hour   Intake 800 ml   Output 400 ml   Net 400 ml       Invasive Devices     Peripheral Intravenous Line            Peripheral IV 09/07/21 Dorsal (posterior); Left Forearm 1 day          Drain            External Urinary Catheter Small 4 days                                        Labs: CBC with diff: @RESUFAST(WBC,HGB,HCT,MCV,PLT,ADJUSTEDWBC,   RBC,MCH,MCHC,RDW,MPV,NRBC,TOTALCELLSCOUNTED,SEGS%,GRANS%,LYMPHS%,EOS%,BASO%,ABNEUT,ABGRANS,ABLYMPHS,ABMOMOS,ABEOS,ABBASO)@,   BMP/CMP:  Lab Results   Component Value Date    K 4 2 09/09/2021     09/09/2021    CO2 23 09/09/2021    BUN 18 09/09/2021    CREATININE 1 00 09/09/2021    CALCIUM 9 7 09/09/2021    AST 33 09/09/2021     (H) 09/09/2021    ALKPHOS 100 09/09/2021    EGFR 74 09/09/2021   ,   Lipid Panel: No results found for: CHOL,   Coags:   Lab Results   Component Value Date    INR 1 18 09/03/2021   ,     Blood Culture:   Lab Results   Component Value Date    BLOODCX No Growth at 48 hrs  09/06/2021   ,   Urinalysis:   Lab Results   Component Value Date    COLORU Yellow 09/04/2021    CLARITYU Clear 09/04/2021    SPECGRAV 1 020 09/04/2021    PHUR 5 5 09/04/2021    LEUKOCYTESUR Negative 09/04/2021    NITRITE Negative 09/04/2021    GLUCOSEU Negative 09/04/2021    KETONESU Negative 09/04/2021    BILIRUBINUR Negative 09/04/2021    BLOODU Trace-Intact (A) 09/04/2021   ,   Urine Culture: No results found for: Gianna Stanley,         Imaging: ERCP Fluoro    Result Date: 9/8/2021  Impression: Sphincterotomy and ampullary dilation with removal of multiple pigmented stones; unable to remove 1 large pigmented stone 7 [place] by 5 cm stent placement RECOMMENDATION: Laparoscopic cholecystectomy per surgery ERCP with stone removal and stent removal in 6-8 weeks Follow LFTs normal Continue antibiotics Alisa Corrigan MD    XR chest 1 view portable    Result Date: 9/3/2021  Impression: Status post cardiac surgery No acute cardiopulmonary disease  Workstation performed: [id number]     XR hip/pelv 2-3 vws left if performed    Result Date: 9/7/2021  Impression: No acute osseous abnormality  Workstation performed: [id number]     XR knee 1 or 2 vw left    Result Date: 9/7/2021  Impression: No acute osseous abnormality   Workstation performed: [id number]     CT chest abdomen pelvis w contrast    Result Date: 9/3/2021  Impression: 1  Mild bronchial wall thickening, which may related to small airwas inflammation  Right basilar subsegmental atelectasis also present  2   No acute abnormality within the abdomen or pelvis  3   Several enhancing foci within the posterior right hepatic lobe, possibly representing hemangiomas  Further characterization with nonemergent liver MRI recommended  4   Horseshoe kidney  The study was marked in [facility] for immediate notification  Workstation performed: [id number]     MRI abdomen w wo contrast and mrcp    Result Date: 9/8/2021  Impression: Choledocholithiasis with dilated common bile duct Mildly dilated persistent duct of [place]orini which opens at the minor papilla, 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 Hepatic steatosis Small enhancing lesions with persistent enhancement in the liver compatible with hemangioma Workstation performed: [id number]     FL ERCP biliary only    Result Date: 9/8/2021  Impression: Fluoroscopic guidance provided for procedure guidance  Please refer to the separate procedure notes for additional details  Workstation performed: [id number]     US right upper quadrant with liver dopplers    Result Date: 9/4/2021  Impression: Hepatomegaly with fatty infiltration  Normal evaluation of the hepatic vasculature  Cholelithiasis with no evidence of acute cholecystitis  Prominent common bile duct, however no obstructing lesions or intrahepatic biliary dilatation identified  If clinical concern remains, consider MRCP for further evaluation   Workstation performed: [id number]         Armando Degroot PA-C  9/9/2021

## 2021-09-09 NOTE — ASSESSMENT & PLAN NOTE
· Patient stated on admission that he woke up with fever and rigors the morning of admission and did state he had an episode of diarrhea today    Patient denies eating anything out of the ordinary yesterday and denies eating any shellfish or undercooked meats  · Secondary to likely intra-abdominal source Gram-negative bacteremia  · Plan as outlined above  · Continue oral Levaquin, metronidazole  · Sepsis has since resolved

## 2021-09-09 NOTE — PROGRESS NOTES
Progress Note - Infectious Disease   Liliane Carson 78 y o  male MRN: [id number]  Unit/Bed#: -01 Encounter: [id number]      1  Sepsis  POA with fever, tachypnea, thrombocytopenia and lactic acidosis with marked elevated transaminase levels   Admission blood cultures grew 1 of 2 sets enterobacter aerogenes   Patient had generalized abdominal discomfort on admission and 1 episode of diarrhea that day   Right upper quadrant ultrasound showed some CBD prominence   LFTs are trending down and patient is clinically improving   Consider biliary source of #1/2   -continue Levaquin 750 mg PO daily   -follow-up repeat blood cultures and adjust antibiotics as needed  -monitor temperature and hemodynamics  -serial exam  -recheck CBC and CMP in a m   -GI on board      2  Enterobacter aerogenes bacteremia  Admission blood cultures grew 1 of 2 sets enterobacter aerogenes   Patient had generalized abdominal discomfort on admission and 1 episode of diarrhea that day   Right upper quadrant ultrasound showed some CBD prominence   LFTs are trending down and patient is clinically improving   Biliary source of #1/2   -antibiotics as above  -monitor temperature and hemodynamics  -follow-up repeat blood cultures     3   Transaminitis   POA  Patient had generalized abdominal discomfort on admission and 1 episode of diarrhea that day   Right upper quadrant ultrasound showed some CBD prominence   LFTs are trending down   MRI/MRCP with CBD dilatation and multiple stones in CBD  9/8/21 ERCP performed with CBD balloon dilatation, removal of 1 10 mm stone and placement of duodenal bend stent for other large unremoval stone  -continue antibiotic as above  -GI following  -surgical consulted for cholecystectomy   -cardiology consulted for surgical cardiac clearance  -trend LFTs     4  PCN/Cefazolin Allergies   Patient tolerated desensitization to penicillin in 2013 for treatment of Enterococcus bacteremia/endocarditis   Patient's wife relays that he had cefazolin within the last few years and developed an a shortness of breath, cold sweats and rash reaction   Patient tolerated 1 dose of Levaquin in the ER and a Levaquin 1 week course in 2021  EKG Qtc 399  -continue Levaquin  -monitor closely for tolerance   -potential rare side effects reviewed and patient agreeable to proceed with Levaquin      5  Prior enterococcus left TKR infection, 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     -antibiotics as above  -monitor temperature and hemodynamics  -follow-up repeat blood cultures     Antibiotics:  Levaquin D3     Above impression and plan discussed in detail with patient, sister and wife at bedside, RN, and primary care team      Subjective:  Patient has no fever, chills, sweats overnight; no nausea, vomiting, diarrhea; no cough, shortness of breath; no RUQ pain but feels a bit bloated  Appears to be tolerating antibiotic well  Objective:  Vitals:  Temp:  [97 5 °F (36 4 °C)-98 1 °F (36 7 °C)] 97 6 °F (36 4 °C)  HR:  [54-60] 59  Resp:  [14-22] 18  BP: (117-151)/(59-90) 139/74  SpO2:  [96 %-99 %] 96 %  Temp (24hrs), Av 6 °F (36 4 °C), Min:97 5 °F (36 4 °C), Max:98 1 °F (36 7 °C)  Current: Temperature: 97 6 °F (36 4 °C)    Physical Exam:   General Appearance:  Alert, interactive, nontoxic, no acute distress  Sitting in recliner   Throat: Oropharynx moist without lesions  Lungs:   Clear to auscultation bilaterally; no wheezes, rhonchi or rales; respirations unlabored   Heart:  RRR; no murmur   Abdomen:   Soft, non-tender, protuberant, positive bowel sounds  Extremities: No clubbing, cyanosis or edema   : No jimenes, no SPT   Skin: No new rashes or lesions  IV site nontender  No draining wounds noted         Labs, Imaging, & Other studies:   All pertinent labs and imaging studies were personally reviewed  Results from last 7 days   Lab Units 21  1030 21  0627 21  0506   WBC Thousand/uL 10 14 6 42 4 85 HEMOGLOBIN g/dL 14 5 14 5 14 9   PLATELETS Thousands/uL 122* 116* 103*     Results from last 7 days   Lab Units 09/09/21  0649 09/08/21  0627 09/07/21  0506 09/06/21  0609   SODIUM mmol/L 136 137 137 136   POTASSIUM mmol/L 4 2 4 2 4 1 4 5   CHLORIDE mmol/L 102 104 105 104   CO2 mmol/L 23 24 24 23   BUN mg/dL 18 16 12 11   CREATININE mg/dL 1 00 0 95 0 94 0 91   EGFR ml/min/1 73sq m 74 79 80 83   CALCIUM mg/dL 9 7 9 2 9 0 9 3   AST U/L 33  --  33 52*   ALT U/L 104*  --  141* 178*   ALK PHOS U/L 100  --  105 106     Results from last 7 days   Lab Units 09/06/21  1258 09/06/21  1257 09/03/21  1338 09/03/21  1149   BLOOD CULTURE  No Growth at 48 hrs  No Growth at 48 hrs  No Growth After 5 Days   Enterobacter aerogenes*   GRAM STAIN RESULT   --   --   --  Gram negative rods*     Results from last 7 days   Lab Units 09/04/21  0451 09/03/21  1659   PROCALCITONIN ng/ml 0 91* 1 39*

## 2021-09-09 NOTE — ASSESSMENT & PLAN NOTE
Lab Results   Component Value Date    EGFR 74 09/09/2021    EGFR 79 09/08/2021    EGFR 80 09/07/2021    CREATININE 1 00 09/09/2021    CREATININE 0 95 09/08/2021    CREATININE 0 94 09/07/2021     · Patient with history of horseshoe kidney  · Creatinine at baseline currently; baseline appears to be 1-1 2  · Continue to monitor

## 2021-09-09 NOTE — PROGRESS NOTES
GI Progress Note - Jaja Ngo 78 y o  male MRN: [id number]    Unit/Bed#: -01 Encounter: [id number]    Subjective: eKiry Denny is feeling well this morning  He feels slightly bloated but denies any significant pain after the ERCP yesterday  He ate well this morning, no nausea/vomiting  Had a BM yesterday  He plans for return to his home in [place] after this hospitalization  Objective:     Vitals: Blood pressure 139/74, pulse 59, temperature 97 6 °F (36 4 °C), resp  rate 18, height 6' 3" (1 905 m), weight 128 kg (281 lb 4 9 oz), SpO2 96 %  ,Body mass index is 35 16 kg/m²  Intake/Output Summary (Last 24 hours) at 9/9/2021 1136  Last data filed at 9/9/2021 0900  Gross per 24 hour   Intake 1060 ml   Output 400 ml   Net 660 ml       Physical Exam:     General Appearance: Alert, appears stated age and cooperative  Lungs: Clear to auscultation bilaterally, no rales or rhonchi, no labored breathing/accessory muscle use  Heart: Regular rate and rhythm, S1, S2 normal, no murmur, click, rub or gallop  Abdomen: Soft, non-tender, non-distended; bowel sounds normal; no masses or no organomegaly  Extremities: No cyanosis, edema    Invasive Devices     Peripheral Intravenous Line            Peripheral IV 09/07/21 Dorsal (posterior); Left Forearm 1 day          Drain            External Urinary Catheter Small 4 days                Lab Results:  Results from last 7 days   Lab Units 09/09/21  1030   WBC Thousand/uL 10 14   HEMOGLOBIN g/dL 14 5   HEMATOCRIT % 43 6   PLATELETS Thousands/uL 122*   NEUTROS PCT % 84*   LYMPHS PCT % 10*   MONOS PCT % 5   EOS PCT % 0     Results from last 7 days   Lab Units 09/09/21  0649   POTASSIUM mmol/L 4 2   CHLORIDE mmol/L 102   CO2 mmol/L 23   BUN mg/dL 18   CREATININE mg/dL 1 00   CALCIUM mg/dL 9 7   ALK PHOS U/L 100   ALT U/L 104*   AST U/L 33     Results from last 7 days   Lab Units 09/03/21  1659   INR  1 18           Imaging Studies: I have personally reviewed pertinent imaging studies  ERCP Fluoro  Result Date: 9/8/2021  Impression: Sphincterotomy and ampullary dilation with removal of multiple pigmented stones; unable to remove 1 large pigmented stone 7 [place] by 5 cm stent placement RECOMMENDATION: Laparoscopic cholecystectomy per surgery ERCP with stone removal and stent removal in 6-8 weeks Follow LFTs normal Continue antibiotics Lauri Leon MD    XR chest 1 view portable  Result Date: 9/3/2021  Impression: Status post cardiac surgery No acute cardiopulmonary disease  Workstation performed: [id number]     XR hip/pelv 2-3 vws left if performed  Result Date: 9/7/2021  Impression: No acute osseous abnormality  Workstation performed: [id number]     XR knee 1 or 2 vw left  Result Date: 9/7/2021  Impression: No acute osseous abnormality  Workstation performed: [id number]     CT chest abdomen pelvis w contrast  Result Date: 9/3/2021  Impression: 1  Mild bronchial wall thickening, which may related to small airwas inflammation  Right basilar subsegmental atelectasis also present  2   No acute abnormality within the abdomen or pelvis  3   Several enhancing foci within the posterior right hepatic lobe, possibly representing hemangiomas  Further characterization with nonemergent liver MRI recommended  4   Horseshoe kidney  The study was marked in [facility] for immediate notification  Workstation performed: [id number]     MRI abdomen w wo contrast and mrcp  Result Date: 9/8/2021  Impression: Choledocholithiasis with dilated common bile duct Mildly dilated persistent duct of [place] which opens at the minor papilla, 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 Hepatic steatosis Small enhancing lesions with persistent enhancement in the liver compatible with hemangioma Workstation performed: [id number]     FL ERCP biliary only  Result Date: 9/8/2021  Impression: Fluoroscopic guidance provided for procedure guidance    Please refer to the separate procedure notes for additional details  Workstation performed: [id number]     US right upper quadrant with liver dopplers  Result Date: 9/4/2021  Impression: Hepatomegaly with fatty infiltration  Normal evaluation of the hepatic vasculature  Cholelithiasis with no evidence of acute cholecystitis  Prominent common bile duct, however no obstructing lesions or intrahepatic biliary dilatation identified  If clinical concern remains, consider MRCP for further evaluation  Workstation performed: [id number]       Assessment and Plan:     Enterobacter Bacteremia  Cholelithiasis with Choledocholithiasis  Elevated LFTs  - He presented with fever, rigors, isolated episode of loose stool, and intermittent RUQ/LUQ discomforts, found to have an enterobacter bacteremia and elevated LFTs on admission  - MRI/MRCP showed a dilated CBD and choledocholithiasis which is likely the cause of his bacteremia  - S/P ERCP yesterday with sphincterotomy, ampullary dilation, removal of multiple stones but unable to remove 1 large 10 mm stone so a plastic 5 cm 7 Fr stent was placed  - LFTs continue to improve and have almost normalized  - Recommend ERCP for stent and stone removal in 6-8 weeks; advised the patient he will need to find a GI provider that can perform an ERCP in [place], he expressed understanding  - Cholecystectomy per surgical team  - Abx per SLIM/ID   - Diet as tolerated      The patient will be seen by Dr Luigi Holt   GI will sign off, call with questions

## 2021-09-09 NOTE — PLAN OF CARE
Problem: Potential for Falls  Goal: Patient will remain free of falls  Description: INTERVENTIONS:  - Educate patient/family on patient safety including physical limitations  - Instruct patient to call for assistance with activity   - Consult OT/PT to assist with strengthening/mobility   - Keep Call bell within reach  - Keep bed low and locked with side rails adjusted as appropriate  - Keep care items and personal belongings within reach  - Initiate and maintain comfort rounds  - Make Fall Risk Sign visible to staff  - Offer Toileting every  Hours, in advance of need  - Initiate/Maintain alarm  - Obtain necessary fall risk management equipment:   - Apply yellow socks and bracelet for high fall risk patients  - Consider moving patient to room near nurses station  Outcome: Progressing     Problem: MOBILITY - ADULT  Goal: Maintain or return to baseline ADL function  Description: INTERVENTIONS:  -  Assess patient's ability to carry out ADLs; assess patient's baseline for ADL function and identify physical deficits which impact ability to perform ADLs (bathing, care of mouth/teeth, toileting, grooming, dressing, etc )  - Assess/evaluate cause of self-care deficits   - Assess range of motion  - Assess patient's mobility; develop plan if impaired  - Assess patient's need for assistive devices and provide as appropriate  - Encourage maximum independence but intervene and supervise when necessary  - Involve family in performance of ADLs  - Assess for home care needs following discharge   - Consider OT consult to assist with ADL evaluation and planning for discharge  - Provide patient education as appropriate  Outcome: Progressing  Goal: Maintains/Returns to pre admission functional level  Description: INTERVENTIONS:  - Perform BMAT or MOVE assessment daily    - Set and communicate daily mobility goal to care team and patient/family/caregiver     - Collaborate with rehabilitation services on mobility goals if consulted  - Perform Range of Motion times a day  - Reposition patient every  hours    - Dangle patient times a day  - Stand patient times a day  - Ambulate patient times a day  - Out of bed to chair  times a day   - Out of bed for meals  times a day  - Out of bed for toileting  - Record patient progress and toleration of activity level   Outcome: Progressing     Problem: PAIN - ADULT  Goal: Verbalizes/displays adequate comfort level or baseline comfort level  Description: Interventions:  - Encourage patient to monitor pain and request assistance  - Assess pain using appropriate pain scale  - Administer analgesics based on type and severity of pain and evaluate response  - Implement non-pharmacological measures as appropriate and evaluate response  - Consider cultural and social influences on pain and pain management  - Notify physician/advanced practitioner if interventions unsuccessful or patient reports new pain  Outcome: Progressing     Problem: INFECTION - ADULT  Goal: Absence or prevention of progression during hospitalization  Description: INTERVENTIONS:  - Assess and monitor for signs and symptoms of infection  - Monitor lab/diagnostic results  - Monitor all insertion sites, i e  indwelling lines, tubes, and drains  - Monitor endotracheal if appropriate and nasal secretions for changes in amount and color  - [place] appropriate cooling/warming therapies per order  - Administer medications as ordered  - Instruct and encourage patient and family to use good hand hygiene technique  - Identify and instruct in appropriate isolation precautions for identified infection/condition  Outcome: Progressing     Problem: SAFETY ADULT  Goal: Patient will remain free of falls  Description: INTERVENTIONS:  - Educate patient/family on patient safety including physical limitations  - Instruct patient to call for assistance with activity   - Consult OT/PT to assist with strengthening/mobility   - Keep Call bell within reach  - Keep bed low and locked with side rails adjusted as appropriate  - Keep care items and personal belongings within reach  - Initiate and maintain comfort rounds  - Make Fall Risk Sign visible to staff  - Offer Toileting every Hours, in advance of need  - Initiate/Maintain alarm  - Obtain necessary fall risk management equipment:   - Apply yellow socks and bracelet for high fall risk patients  - Consider moving patient to room near nurses station  Outcome: Progressing  Goal: Maintain or return to baseline ADL function  Description: INTERVENTIONS:  -  Assess patient's ability to carry out ADLs; assess patient's baseline for ADL function and identify physical deficits which impact ability to perform ADLs (bathing, care of mouth/teeth, toileting, grooming, dressing, etc )  - Assess/evaluate cause of self-care deficits   - Assess range of motion  - Assess patient's mobility; develop plan if impaired  - Assess patient's need for assistive devices and provide as appropriate  - Encourage maximum independence but intervene and supervise when necessary  - Involve family in performance of ADLs  - Assess for home care needs following discharge   - Consider OT consult to assist with ADL evaluation and planning for discharge  - Provide patient education as appropriate  Outcome: Progressing  Goal: Maintains/Returns to pre admission functional level  Description: INTERVENTIONS:  - Perform BMAT or MOVE assessment daily    - Set and communicate daily mobility goal to care team and patient/family/caregiver  - Collaborate with rehabilitation services on mobility goals if consulted  - Perform Range of Motion times a day  - Reposition patient every  hours    - Dangle patient  times a day  - Stand patient  times a day  - Ambulate patient  times a day  - Out of bed to chair times a day   - Out of bed for meals  times a day  - Out of bed for toileting  - Record patient progress and toleration of activity level   Outcome: Progressing     Problem: DISCHARGE PLANNING  Goal: Discharge to home or other facility with appropriate resources  Description: INTERVENTIONS:  - Identify barriers to discharge w/patient and caregiver  - Arrange for needed discharge resources and transportation as appropriate  - Identify discharge learning needs (meds, wound care, etc )  - Arrange for interpretive services to assist at discharge as needed  - Refer to Case Management Department for coordinating discharge planning if the patient needs post-hospital services based on physician/advanced practitioner order or complex needs related to functional status, cognitive ability, or social support system  Outcome: Progressing     Problem: Knowledge Deficit  Goal: Patient/family/caregiver demonstrates understanding of disease process, treatment plan, medications, and discharge instructions  Description: Complete learning assessment and assess knowledge base  Interventions:  - Provide teaching at level of understanding  - Provide teaching via preferred learning methods  Outcome: Progressing     Problem: Nutrition/Hydration-ADULT  Goal: Nutrient/Hydration intake appropriate for improving, restoring or maintaining nutritional needs  Description: Monitor and assess patient's nutrition/hydration status for malnutrition  Collaborate with interdisciplinary team and initiate plan and interventions as ordered  Monitor patient's weight and dietary intake as ordered or per policy  Utilize nutrition screening tool and intervene as necessary  Determine patient's food preferences and provide high-protein, high-caloric foods as appropriate       INTERVENTIONS:  - Monitor oral intake, urinary output, labs, and treatment plans  - Assess nutrition and hydration status and recommend course of action  - Evaluate amount of meals eaten  - Assist patient with eating if necessary   - Allow adequate time for meals  - Recommend/ encourage appropriate diets, oral nutritional supplements, and vitamin/mineral supplements  - Order, calculate, and assess calorie counts as needed  - Recommend, monitor, and adjust tube feedings and TPN/PPN based on assessed needs  - Assess need for intravenous fluids  - Provide specific nutrition/hydration education as appropriate  - Include patient/family/caregiver in decisions related to nutrition  Outcome: Progressing

## 2021-09-09 NOTE — ASSESSMENT & PLAN NOTE
Patient does have 1/2 blood cultures positive for Enterobacter aerogenous  Apparently this is sensitive to aztreonam     Likely from intra-abdominal source  MRCP performed yesterday showed choledocholithiasis  Status post stone removal  Currently on oral Levaquin and metronidazole  Follow-up ID recommendations

## 2021-09-09 NOTE — APP STUDENT NOTE
Progress Note - General Surgery   Bella Stephanie 78 y o  male MRN: [id number]  Unit/Bed#: -Susan Encounter: [id number]    Assessment:  Patient is a 71y  o  male  Choledocholithiasis: MRI confirmed choledocholithiasis and CBD dilation of 1 4cm  -ERCP showed multiple pigmented stones in CBD, all stones were able to be removed with the exception of one large pigmented stone, stent was placed  - ALT is elevated (104), AST is wnl (33)  -RUQ tenderness, positive rebound tenderness, increased bloating  Bactericemia: afibrile, WBC wnl (10 14), blood cultures showed gram negative bacteria    Plan:  -Decide for laparoscopic cholecystectomy during current admission vs elective outpatient procedure in [place], awaiting Cardiac Clearance   - Continue regular diet  -Trend Labs and monitor vitals  -Continue pain regimen prn   -Antiemetic medication prn  -Serial Abdominal exams  -continue antibiotics therapy per slim    Subjective/Objective       Subjective:  Patient has mild pain in his RUQ, the pain does not radiate  He feels "very bloated and full of gas"  He is having normal bowel movements and passing gas  Patient is confused as to when his cholecystectomy surgery will occur  Objective:     Blood pressure 139/74, pulse 59, temperature 97 6 °F (36 4 °C), resp  rate 18, height 6' 3" (1 905 m), weight 128 kg (281 lb 4 9 oz), SpO2 96 %  ,Body mass index is 35 16 kg/m²  Intake/Output Summary (Last 24 hours) at 9/9/2021 1158  Last data filed at 9/9/2021 0900  Gross per 24 hour   Intake 1060 ml   Output 400 ml   Net 660 ml       Invasive Devices     Peripheral Intravenous Line            Peripheral IV 09/07/21 Dorsal (posterior); Left Forearm 1 day          Drain            External Urinary Catheter Small 4 days                Physical Exam: /74   Pulse 59   Temp 97 6 °F (36 4 °C)   Resp 18   Ht 6' 3" (1 905 m)   Wt 128 kg (281 lb 4 9 oz)   SpO2 96%   BMI 35 16 kg/m²     General Appearance:    Alert, cooperative, no distress, appears stated age   Head:    Normocephalic, without obvious abnormality, atraumatic   Eyes:    PERRL, conjunctiva/corneas clear,                           Lungs:     Clear to auscultation bilaterally, respirations unlabored, no wheezes, rales  Rhonchi, or crackles   Chest wall:    No tenderness or deformity   Heart:    Regular rate and rhythm, S1 and S2 normal, no murmur, rub   or gallop   Abdomen:     Soft, bowel sounds active all four quadrants,     no masses, no organomegaly, mild tenderness in RUQ, positive for rebound tenderness, no guarding           Extremities:   Surgical scars on bilateral knees, +1 edema on lower extremities, non-tender       Skin:   Skin color, texture, turgor normal, no rashes or lesions       Neurologic:   CNII-XII intact  Normal strength, sensation and reflexes       throughout       Lab, Imaging and other studies:  I have personally reviewed pertinent lab results    , CBC:   Lab Results   Component Value Date    WBC 10 14 09/09/2021    HGB 14 5 09/09/2021    HCT 43 6 09/09/2021    MCV 85 09/09/2021     (L) 09/09/2021    MCH 28 1 09/09/2021    MCHC 33 3 09/09/2021    RDW 14 0 09/09/2021    MPV 9 7 09/09/2021    NRBC 0 09/09/2021   , CMP:   Lab Results   Component Value Date    SODIUM 136 09/09/2021    K 4 2 09/09/2021     09/09/2021    CO2 23 09/09/2021    BUN 18 09/09/2021    CREATININE 1 00 09/09/2021    CALCIUM 9 7 09/09/2021    AST 33 09/09/2021     (H) 09/09/2021    ALKPHOS 100 09/09/2021    EGFR 74 09/09/2021     VTE Pharmacologic Prophylaxis: Sequential compression device (Venodyne)  and Heparin  VTE Mechanical Prophylaxis: sequential compression device

## 2021-09-09 NOTE — ASSESSMENT & PLAN NOTE
No results found for: HGBA1C    Recent Labs     09/08/21  1543 09/08/21  1548 09/08/21  2047 09/09/21  0722   POCGLU 171* 178* 246* 221*       Blood Sugar Average: Last 72 hrs:  · (P) 296 [id number] hold oral antihyperglycemics    · Correctional scale insulin  · Glargine 28 units at night  · lispro 12 units with dinner  · NovoLog 70/3022 units in the morning

## 2021-09-09 NOTE — PROGRESS NOTES
[facility]  Progress Note Clarke Miguel 1945, 78 y o  male MRN: [id number]  Unit/Bed#: -Susan Encounter: [id number]  Primary Care Provider: Apple Beyer   Date and time admitted to hospital: 9/3/2021 11:36 AM    Choledocholithiasis  Assessment & Plan  Noted on MRCP  Status post ERCP with stone removal  Surgery consulted plan for cholecystectomy inpatient versus outpatient  Follow-up recommendations      Gram-negative bacteremia  Assessment & Plan  Patient does have 1/2 blood cultures positive for Enterobacter aerogenous  Apparently this is sensitive to aztreonam     Likely from intra-abdominal source  MRCP performed yesterday showed choledocholithiasis  Status post stone removal  Currently on oral Levaquin and metronidazole  Follow-up ID recommendations    DM (diabetes mellitus), type 2 [facility])  Assessment & Plan  No results found for: HGBA1C    Recent Labs     09/08/21  1543 09/08/21  1548 09/08/21  2047 09/09/21  0722   POCGLU 171* 178* 246* 221*       Blood Sugar Average: Last 72 hrs:  · (P) 748 [id number] hold oral antihyperglycemics    · Correctional scale insulin  · Glargine 28 units at night  · lispro 12 units with dinner  · NovoLog 70/3022 units in the morning      Thrombocytopathia ([facility] [address] )  Assessment & Plan  · Platelets at baseline  · May be secondary to sepsis  · No signs of bleeding  · Monitor    Elevated troponin  Assessment & Plan  · Mildly elevated troponin with flat curve is likely non MI troponin elevation  Patient denies any chest pain      Hypothyroidism  Assessment & Plan  Continue levothyroxine 50 mcg daily    Hypomagnesemia  Assessment & Plan  Repleted    Hyperlipidemia associated with type 2 diabetes mellitus ([facility] [address] )  Assessment & Plan  Given markedly elevated AST and ALT will hold statin at this time      Horseshoe kidney with renal calculus  Assessment & Plan  Plan is under CKD    History of endocarditis  Assessment & Plan  · 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  Given history of endocarditis and bioprosthetic aortic valve if persistent bacteremia consider echocardiogram      CKD (chronic kidney disease) stage 3, GFR 30-59 ml/min [facility])  Assessment & Plan  Lab Results   Component Value Date    EGFR 74 09/09/2021    EGFR 79 09/08/2021    EGFR 80 09/07/2021    CREATININE 1 00 09/09/2021    CREATININE 0 95 09/08/2021    CREATININE 0 94 09/07/2021     · Patient with history of horseshoe kidney  · Creatinine at baseline currently; baseline appears to be 1-1 2  · Continue to monitor    Benign prostatic hyperplasia without lower urinary tract symptoms  Assessment & Plan  Continue tamsulosin    * Sepsis ([address] )  Assessment & Plan  · Patient stated on admission that he woke up with fever and rigors the morning of admission and did state he had an episode of diarrhea today  Patient denies eating anything out of the ordinary yesterday and denies eating any shellfish or undercooked meats  · Secondary to likely intra-abdominal source Gram-negative bacteremia  · Plan as outlined above  · Continue oral Levaquin, metronidazole  · Sepsis has since resolved        VTE Pharmacologic Prophylaxis:   Pharmacologic: Heparin  Mechanical VTE Prophylaxis in Place: Yes    Patient Centered Rounds: I have performed bedside rounds with nursing staff today  Discussions with Specialists or Other Care Team Provider: cm, nursing    Education and Discussions with Family / Patient: pt    Time Spent for Care: 30 minutes  More than 50% of total time spent on counseling and coordination of care as described above      Current Length of Stay: 6 day(s)    Current Patient Status: Inpatient   Certification Statement: The patient will continue to require additional inpatient hospital stay due to see above    Discharge Plan: see above    Code Status: Level 3 - DNAR and DNI      Subjective:   No acute complaints    Objective:     Vitals:   Temp (24hrs), Av 6 °F (36 4 °C), Min:97 5 °F (36 4 °C), Max:98 1 °F (36 7 °C)    Temp:  [97 5 °F (36 4 °C)-98 1 °F (36 7 °C)] 97 6 °F (36 4 °C)  HR:  [54-60] 59  Resp:  [14-22] 18  BP: (117-151)/(59-90) 139/74  SpO2:  [96 %-99 %] 96 %  Body mass index is 35 16 kg/m²  Input and Output Summary (last 24 hours): Intake/Output Summary (Last 24 hours) at 2021 0852  Last data filed at 2021 0227  Gross per 24 hour   Intake 820 ml   Output 400 ml   Net 420 ml       Physical Exam:     Physical Exam  Constitutional:       General: He is not in acute distress  Appearance: He is well-developed  He is not diaphoretic  HENT:      Head: Normocephalic and atraumatic  Nose: Nose normal       Mouth/Throat:      Pharynx: No oropharyngeal exudate  Eyes:      General: No scleral icterus  Conjunctiva/sclera: Conjunctivae normal    Cardiovascular:      Rate and Rhythm: Normal rate and regular rhythm  Heart sounds: Normal heart sounds  No murmur heard  No friction rub  No gallop  Pulmonary:      Effort: Pulmonary effort is normal  No respiratory distress  Breath sounds: Normal breath sounds  No wheezing or rales  Chest:      Chest wall: No tenderness  Abdominal:      General: Bowel sounds are normal  There is no distension  Palpations: Abdomen is soft  Tenderness: There is no abdominal tenderness  There is no guarding  Musculoskeletal:         General: No tenderness or deformity  Normal range of motion  Cervical back: Normal range of motion and neck supple  Skin:     General: Skin is warm and dry  Findings: No erythema  Neurological:      Mental Status: He is alert  Mental status is at baseline             Additional Data:     Labs:    Results from last 7 days   Lab Units 21  0627   WBC Thousand/uL 6 42   HEMOGLOBIN g/dL 14 5   HEMATOCRIT % 43 9   PLATELETS Thousands/uL 116*   NEUTROS PCT % 63   LYMPHS PCT % 15   MONOS PCT % 9   EOS PCT % 11*     Results from last 7 days   Lab Units 09/09/21  0649 09/07/21  0506   SODIUM mmol/L 136 137   POTASSIUM mmol/L 4 2 4 1   CHLORIDE mmol/L 102 105   CO2 mmol/L 23 24   BUN mg/dL 18 12   CREATININE mg/dL 1 00 0 94   ANION GAP mmol/L 11 8   CALCIUM mg/dL 9 7 9 0   ALBUMIN g/dL  --  2 9*   TOTAL BILIRUBIN mg/dL  --  0 45   ALK PHOS U/L  --  105   ALT U/L  --  141*   AST U/L  --  33   GLUCOSE RANDOM mg/dL 234* 195*     Results from last 7 days   Lab Units 09/03/21  1659   INR  1 18     Results from last 7 days   Lab Units 09/09/21  0722 09/08/21  2047 09/08/21  1548 09/08/21  1543 09/08/21  1330 09/08/21  1041 09/08/21  0757 09/07/21  2111 09/07/21  1643 09/07/21  1201 09/07/21  0751 09/06/21  2158   POC GLUCOSE mg/dl 221* 246* 178* 171* 176* 157* 160* 146* 265* 263* 203* 321*         Results from last 7 days   Lab Units 09/04/21  0451 09/03/21  1659 09/03/21  1338 09/03/21  1149   LACTIC ACID mmol/L  --  1 9 2 3* 2 2*   PROCALCITONIN ng/ml 0 91* 1 39*  --   --            * I Have Reviewed All Lab Data Listed Above  * Additional Pertinent Lab Tests Reviewed: All Labs Within Last 24 Hours Reviewed    Imaging:    Imaging Reports Reviewed Today Include: na  Imaging Personally Reviewed by Myself Includes:  na    Recent Cultures (last 7 days):     Results from last 7 days   Lab Units 09/06/21  1258 09/06/21  1257 09/03/21  1338 09/03/21  1149   BLOOD CULTURE  No Growth at 48 hrs  No Growth at 48 hrs  No Growth After 5 Days   Enterobacter aerogenes*   GRAM STAIN RESULT   --   --   --  Gram negative rods*       Last 24 Hours Medication List:   Current Facility-Administered Medications   Medication Dose Route Frequency Provider Last Rate    acetaminophen  650 mg Oral Q6H PRN Marlea Sacks, MD      aspirin  81 mg Oral Daily Marlea Sacks, MD      Diclofenac Sodium  2 g Topical 4x Daily Marlea Sacks, MD      gabapentin  800 mg Oral TID Marlea Sacks, MD      guaiFENesin  600 mg Oral Q12H [address] Natoma Cure Summer Taveras MD      heparin (porcine)  5,000 Units Subcutaneous [facility] Oletta Check, MD      HYDROmorphone  0 5 mg Intravenous Q6H PRN Oletta Check, MD      insulin aspart protamine-insulin aspart  22 Units Subcutaneous Daily Oletta Check, MD      insulin glargine  28 Units Subcutaneous HS Oletta Check, MD      insulin lispro  1-5 Units Subcutaneous HS Oletta Check, MD      insulin lispro  12 Units Subcutaneous Daily With Dinner Eve Albarado III, MD      insulin lispro  2-12 Units Subcutaneous TID AC Oletta Check, MD      lactated ringers  20 mL/hr Intravenous Continuous Radhacy Radnall, CONNIE 20 mL/hr (09/08/21 1430)    levofloxacin  750 mg Oral Q24H Oletta Check, MD      levothyroxine  50 mcg Oral Daily Oletta Check, MD      loratadine  10 mg Oral Daily Eve Albarado III, MD      metroNIDAZOLE  500 mg Intravenous Q8H Oletta Check,  mg (09/09/21 8522)    oxyCODONE  5 mg Oral Q4H PRN Oletta Check, MD      pramipexole  1 5 mg Oral BID Oletta Check, MD      propranolol  60 mg Oral Daily Oletta Check, MD      tamsulosin  0 4 mg Oral Daily With Linda Morales MD          Today, Patient Was Seen By: Naif Flanagan MD    ** Please Note: Dictation voice to text software may have been used in the creation of this document   **

## 2021-09-09 NOTE — ASSESSMENT & PLAN NOTE
Noted on MRCP  Status post ERCP with stone removal  Surgery consulted plan for cholecystectomy inpatient versus outpatient  Follow-up recommendations

## 2021-09-10 VITALS
TEMPERATURE: 97.9 F | OXYGEN SATURATION: 95 % | HEIGHT: 75 IN | RESPIRATION RATE: 18 BRPM | HEART RATE: 55 BPM | WEIGHT: 281.31 LBS | BODY MASS INDEX: 34.98 KG/M2 | SYSTOLIC BLOOD PRESSURE: 127 MMHG | DIASTOLIC BLOOD PRESSURE: 66 MMHG

## 2021-09-10 LAB
ANION GAP SERPL CALCULATED.3IONS-SCNC: 12 MMOL/L (ref 4–13)
BASOPHILS # BLD AUTO: 0.06 THOUSANDS/ΜL (ref 0–0.1)
BASOPHILS NFR BLD AUTO: 1 % (ref 0–1)
BUN SERPL-MCNC: 20 MG/DL (ref 5–25)
CALCIUM SERPL-MCNC: 9.1 MG/DL (ref 8.3–10.1)
CHLORIDE SERPL-SCNC: 102 MMOL/L (ref 100–108)
CO2 SERPL-SCNC: 22 MMOL/L (ref 21–32)
CREAT SERPL-MCNC: 1.08 MG/DL (ref 0.6–1.3)
EOSINOPHIL # BLD AUTO: 0.25 THOUSAND/ΜL (ref 0–0.61)
EOSINOPHIL NFR BLD AUTO: 4 % (ref 0–6)
ERYTHROCYTE [DISTWIDTH] IN BLOOD BY AUTOMATED COUNT: 14.1 % (ref 11.6–15.1)
GFR SERPL CREATININE-BSD FRML MDRD: 67 ML/MIN/1.73SQ M
GLUCOSE SERPL-MCNC: 222 MG/DL (ref 65–140)
GLUCOSE SERPL-MCNC: 244 MG/DL (ref 65–140)
GLUCOSE SERPL-MCNC: 283 MG/DL (ref 65–140)
HCT VFR BLD AUTO: 43.3 % (ref 36.5–49.3)
HGB BLD-MCNC: 14.8 G/DL (ref 12–17)
IMM GRANULOCYTES # BLD AUTO: 0.07 THOUSAND/UL (ref 0–0.2)
IMM GRANULOCYTES NFR BLD AUTO: 1 % (ref 0–2)
LYMPHOCYTES # BLD AUTO: 1.27 THOUSANDS/ΜL (ref 0.6–4.47)
LYMPHOCYTES NFR BLD AUTO: 18 % (ref 14–44)
MCH RBC QN AUTO: 28.9 PG (ref 26.8–34.3)
MCHC RBC AUTO-ENTMCNC: 34.2 G/DL (ref 31.4–37.4)
MCV RBC AUTO: 85 FL (ref 82–98)
MONOCYTES # BLD AUTO: 0.54 THOUSAND/ΜL (ref 0.17–1.22)
MONOCYTES NFR BLD AUTO: 8 % (ref 4–12)
NEUTROPHILS # BLD AUTO: 5.05 THOUSANDS/ΜL (ref 1.85–7.62)
NEUTS SEG NFR BLD AUTO: 68 % (ref 43–75)
NRBC BLD AUTO-RTO: 0 /100 WBCS
PLATELET # BLD AUTO: 138 THOUSANDS/UL (ref 149–390)
PMV BLD AUTO: 9.6 FL (ref 8.9–12.7)
POTASSIUM SERPL-SCNC: 4 MMOL/L (ref 3.5–5.3)
RBC # BLD AUTO: 5.12 MILLION/UL (ref 3.88–5.62)
SODIUM SERPL-SCNC: 136 MMOL/L (ref 136–145)
WBC # BLD AUTO: 7.24 THOUSAND/UL (ref 4.31–10.16)

## 2021-09-10 PROCEDURE — 80048 BASIC METABOLIC PNL TOTAL CA: CPT | Performed by: INTERNAL MEDICINE

## 2021-09-10 PROCEDURE — 99232 SBSQ HOSP IP/OBS MODERATE 35: CPT | Performed by: INTERNAL MEDICINE

## 2021-09-10 PROCEDURE — 99239 HOSP IP/OBS DSCHRG MGMT >30: CPT | Performed by: INTERNAL MEDICINE

## 2021-09-10 PROCEDURE — 82948 REAGENT STRIP/BLOOD GLUCOSE: CPT

## 2021-09-10 PROCEDURE — 85025 COMPLETE CBC W/AUTO DIFF WBC: CPT | Performed by: INTERNAL MEDICINE

## 2021-09-10 RX ORDER — LEVOFLOXACIN 750 MG/1
750 TABLET ORAL EVERY 24 HOURS
Qty: 6 TABLET | Refills: 0 | Status: SHIPPED | OUTPATIENT
Start: 2021-09-10 | End: 2021-09-16

## 2021-09-10 RX ADMIN — LEVOFLOXACIN 750 MG: 500 TABLET, FILM COATED ORAL at 12:46

## 2021-09-10 RX ADMIN — INSULIN LISPRO 6 UNITS: 100 INJECTION, SOLUTION INTRAVENOUS; SUBCUTANEOUS at 12:46

## 2021-09-10 RX ADMIN — DICLOFENAC SODIUM 2 G: 10 GEL TOPICAL at 09:07

## 2021-09-10 RX ADMIN — INSULIN ASPART 22 UNITS: 100 INJECTION, SUSPENSION SUBCUTANEOUS at 09:07

## 2021-09-10 RX ADMIN — ASPIRIN 81 MG CHEWABLE TABLET 81 MG: 81 TABLET CHEWABLE at 09:04

## 2021-09-10 RX ADMIN — INSULIN LISPRO 4 UNITS: 100 INJECTION, SOLUTION INTRAVENOUS; SUBCUTANEOUS at 09:06

## 2021-09-10 RX ADMIN — OXYCODONE HYDROCHLORIDE 5 MG: 5 TABLET ORAL at 04:44

## 2021-09-10 RX ADMIN — GABAPENTIN 800 MG: 400 CAPSULE ORAL at 09:04

## 2021-09-10 RX ADMIN — GUAIFENESIN 600 MG: 600 TABLET ORAL at 09:04

## 2021-09-10 RX ADMIN — PRAMIPEXOLE DIHYDROCHLORIDE 1.5 MG: 0.5 TABLET ORAL at 09:04

## 2021-09-10 RX ADMIN — LEVOTHYROXINE SODIUM 50 MCG: 50 TABLET ORAL at 09:04

## 2021-09-10 RX ADMIN — HEPARIN SODIUM 5000 UNITS: 5000 INJECTION INTRAVENOUS; SUBCUTANEOUS at 06:05

## 2021-09-10 RX ADMIN — PROPRANOLOL HYDROCHLORIDE 60 MG: 20 TABLET ORAL at 09:04

## 2021-09-10 RX ADMIN — LORATADINE 10 MG: 10 TABLET ORAL at 09:04

## 2021-09-10 NOTE — ASSESSMENT & PLAN NOTE
No results found for: HGBA1C    Recent Labs     09/09/21  1137 09/09/21  1613 09/09/21  2111 09/10/21  0721   POCGLU 316* 191* 167* 222*       Blood Sugar Average: Last 72 hrs:  · (P) 520 [id number] hold oral antihyperglycemics    · Correctional scale insulin  · Glargine 28 units at night  · lispro 12 units with dinner  · NovoLog 70/3022 units in the morning

## 2021-09-10 NOTE — PLAN OF CARE
Problem: Potential for Falls  Goal: Patient will remain free of falls  Description: INTERVENTIONS:  - Educate patient/family on patient safety including physical limitations  - Instruct patient to call for assistance with activity   - Consult OT/PT to assist with strengthening/mobility   - Keep Call bell within reach  - Keep bed low and locked with side rails adjusted as appropriate  - Keep care items and personal belongings within reach  - Initiate and maintain comfort rounds  - Make Fall Risk Sign visible to staff  - Offer Toileting every  Hours, in advance of need  - Initiate/Maintain alarm  - Obtain necessary fall risk management equipment:   - Apply yellow socks and bracelet for high fall risk patients  - Consider moving patient to room near nurses station  Outcome: Progressing     Problem: MOBILITY - ADULT  Goal: Maintain or return to baseline ADL function  Description: INTERVENTIONS:  -  Assess patient's ability to carry out ADLs; assess patient's baseline for ADL function and identify physical deficits which impact ability to perform ADLs (bathing, care of mouth/teeth, toileting, grooming, dressing, etc )  - Assess/evaluate cause of self-care deficits   - Assess range of motion  - Assess patient's mobility; develop plan if impaired  - Assess patient's need for assistive devices and provide as appropriate  - Encourage maximum independence but intervene and supervise when necessary  - Involve family in performance of ADLs  - Assess for home care needs following discharge   - Consider OT consult to assist with ADL evaluation and planning for discharge  - Provide patient education as appropriate  Outcome: Progressing  Goal: Maintains/Returns to pre admission functional level  Description: INTERVENTIONS:  - Perform BMAT or MOVE assessment daily    - Set and communicate daily mobility goal to care team and patient/family/caregiver     - Collaborate with rehabilitation services on mobility goals if consulted  - Perform Range of Motion  times a day  - Reposition patient every hours    - Dangle patient  times a day  - Stand patient  times a day  - Ambulate patient times a day  - Out of bed to chair times a day   - Out of bed for meals  times a day  - Out of bed for toileting  - Record patient progress and toleration of activity level   Outcome: Progressing     Problem: PAIN - ADULT  Goal: Verbalizes/displays adequate comfort level or baseline comfort level  Description: Interventions:  - Encourage patient to monitor pain and request assistance  - Assess pain using appropriate pain scale  - Administer analgesics based on type and severity of pain and evaluate response  - Implement non-pharmacological measures as appropriate and evaluate response  - Consider cultural and social influences on pain and pain management  - Notify physician/advanced practitioner if interventions unsuccessful or patient reports new pain  Outcome: Progressing     Problem: INFECTION - ADULT  Goal: Absence or prevention of progression during hospitalization  Description: INTERVENTIONS:  - Assess and monitor for signs and symptoms of infection  - Monitor lab/diagnostic results  - Monitor all insertion sites, i e  indwelling lines, tubes, and drains  - Monitor endotracheal if appropriate and nasal secretions for changes in amount and color  - [place] appropriate cooling/warming therapies per order  - Administer medications as ordered  - Instruct and encourage patient and family to use good hand hygiene technique  - Identify and instruct in appropriate isolation precautions for identified infection/condition  Outcome: Progressing     Problem: SAFETY ADULT  Goal: Patient will remain free of falls  Description: INTERVENTIONS:  - Educate patient/family on patient safety including physical limitations  - Instruct patient to call for assistance with activity   - Consult OT/PT to assist with strengthening/mobility   - Keep Call bell within reach  - Keep bed low and locked with side rails adjusted as appropriate  - Keep care items and personal belongings within reach  - Initiate and maintain comfort rounds  - Make Fall Risk Sign visible to staff  - Offer Toileting every  Hours, in advance of need  - Initiate/Maintain alarm  - Obtain necessary fall risk management equipment:   - Apply yellow socks and bracelet for high fall risk patients  - Consider moving patient to room near nurses station  Outcome: Progressing  Goal: Maintain or return to baseline ADL function  Description: INTERVENTIONS:  -  Assess patient's ability to carry out ADLs; assess patient's baseline for ADL function and identify physical deficits which impact ability to perform ADLs (bathing, care of mouth/teeth, toileting, grooming, dressing, etc )  - Assess/evaluate cause of self-care deficits   - Assess range of motion  - Assess patient's mobility; develop plan if impaired  - Assess patient's need for assistive devices and provide as appropriate  - Encourage maximum independence but intervene and supervise when necessary  - Involve family in performance of ADLs  - Assess for home care needs following discharge   - Consider OT consult to assist with ADL evaluation and planning for discharge  - Provide patient education as appropriate  Outcome: Progressing  Goal: Maintains/Returns to pre admission functional level  Description: INTERVENTIONS:  - Perform BMAT or MOVE assessment daily    - Set and communicate daily mobility goal to care team and patient/family/caregiver  - Collaborate with rehabilitation services on mobility goals if consulted  - Perform Range of Motion times a day  - Reposition patient every hours    - Dangle patient  times a day  - Stand patient  times a day  - Ambulate patient  times a day  - Out of bed to chair  times a day   - Out of bed for meals  times a day  - Out of bed for toileting  - Record patient progress and toleration of activity level   Outcome: Progressing     Problem: DISCHARGE PLANNING  Goal: Discharge to home or other facility with appropriate resources  Description: INTERVENTIONS:  - Identify barriers to discharge w/patient and caregiver  - Arrange for needed discharge resources and transportation as appropriate  - Identify discharge learning needs (meds, wound care, etc )  - Arrange for interpretive services to assist at discharge as needed  - Refer to Case Management Department for coordinating discharge planning if the patient needs post-hospital services based on physician/advanced practitioner order or complex needs related to functional status, cognitive ability, or social support system  Outcome: Progressing     Problem: Knowledge Deficit  Goal: Patient/family/caregiver demonstrates understanding of disease process, treatment plan, medications, and discharge instructions  Description: Complete learning assessment and assess knowledge base  Interventions:  - Provide teaching at level of understanding  - Provide teaching via preferred learning methods  Outcome: Progressing     Problem: Nutrition/Hydration-ADULT  Goal: Nutrient/Hydration intake appropriate for improving, restoring or maintaining nutritional needs  Description: Monitor and assess patient's nutrition/hydration status for malnutrition  Collaborate with interdisciplinary team and initiate plan and interventions as ordered  Monitor patient's weight and dietary intake as ordered or per policy  Utilize nutrition screening tool and intervene as necessary  Determine patient's food preferences and provide high-protein, high-caloric foods as appropriate       INTERVENTIONS:  - Monitor oral intake, urinary output, labs, and treatment plans  - Assess nutrition and hydration status and recommend course of action  - Evaluate amount of meals eaten  - Assist patient with eating if necessary   - Allow adequate time for meals  - Recommend/ encourage appropriate diets, oral nutritional supplements, and vitamin/mineral supplements  - Order, calculate, and assess calorie counts as needed  - Recommend, monitor, and adjust tube feedings and TPN/PPN based on assessed needs  - Assess need for intravenous fluids  - Provide specific nutrition/hydration education as appropriate  - Include patient/family/caregiver in decisions related to nutrition  Outcome: Progressing

## 2021-09-10 NOTE — ASSESSMENT & PLAN NOTE
Patient does have 1/2 blood cultures positive for Enterobacter aerogenous  Apparently this is sensitive to aztreonam     Likely from intra-abdominal source  MRCP performed yesterday showed choledocholithiasis  Status post stone removal  Currently on oral Levaquin to complete 9/15  Will have outpatient surgery performed once antibiotics completed which will occur MANN MCKEON  [facility]

## 2021-09-10 NOTE — CASE MANAGEMENT
Case Management Progress Note    Patient name Irene Pool  Location /-16 MRN [id number]  : 1945 Date 9/10/2021       LOS (days): 7  Geometric Mean LOS (GMLOS) (days): 3 50  Days to GMLOS:-3 3        BUNDLE:      OBJECTIVE:  Pt is a 78y o  year old /[employer], white or  [1], male with [employer] preference of None admitted on 9/3/2021 11:36 AM  Pt is admitted to [place] [address] at [address] [place] [address] with complaints of Sepsis ([facility] [address] )     Current admission status: Inpatient  Preferred Pharmacy:   PATIENT/FAMILY REPORTS NO PREFERRED PHARMACY  No address on file      [id number] [place] [id number]  Phone: [phone] Fax: [phone]    Primary Care Provider: Apple Vasquez    Primary Insurance: MEDICARE  Secondary Insurance: BLUE CROSS    PROGRESS NOTE:  SEPSIS,(+) BLD CX FEVER, HX PARKINSON'S, , IV ATBX, MARINA SURG,TO BE DONE AT HIS STATE / HOME NO NEEDS ANTICIPATED / FAMILY WILL TRANSPORT

## 2021-09-10 NOTE — PROGRESS NOTES
Progress Note - Infectious Disease   Negro Form 78 y o  male MRN: [id number]  Unit/Bed#: -01 Encounter: [id number]      Impression/Plan:  1  Sepsis  POA with fever, tachypnea, thrombocytopenia and lactic acidosis with marked elevated transaminase levels   Admission blood cultures grew 1 of 2 sets enterobacter aerogenes   Patient had generalized abdominal discomfort on admission and 1 episode of diarrhea that day   Right upper quadrant ultrasound showed some CBD prominence   LFTs are trending down and patient is clinically improving   Biliary source of #1/2   -continue Levaquin 750 mg PO daily x 1 week post ERCP as below  -follow-up repeat blood cultures  -monitor temperature and hemodynamics     2  Enterobacter aerogenes bacteremia  Admission blood cultures grew 1 of 2 sets enterobacter aerogenes   Patient had generalized abdominal discomfort on admission and 1 episode of diarrhea that day   Right upper quadrant ultrasound showed some CBD prominence   LFTs are trending down and patient is clinically improving   Biliary source of #1/2   -antibiotics as above  -monitor temperature and hemodynamics  -follow-up repeat blood cultures     3   Transaminitis   POA  Patient had generalized abdominal discomfort on admission and 1 episode of diarrhea that day   Right upper quadrant ultrasound showed some CBD prominence   LFTs are trending down   MRI/MRCP with CBD dilatation and multiple stones in CBD  9/8/21 ERCP performed with CBD balloon dilatation, removal of 1 10 mm stone and placement of duodenal bend stent for other large unremoval stone  -continue antibiotic as above  -GI following  -outpatient cholecystectomy to be determined upon return to [place]  -will complete a week of Levaquin post ERCP through 9/15/21     4  PCN/Cefazolin Allergies   Patient tolerated desensitization to penicillin in 2013 for treatment of Enterococcus bacteremia/endocarditis   Patient's wife relays that he had cefazolin within the last few years and developed an a shortness of breath, cold sweats and rash reaction   Patient tolerated 1 dose of Levaquin in the ER and a Levaquin 1 week course in 2021  EKG Qtc 399  -continue Levaquin  -monitor closely for tolerance   -potential rare side effects reviewed and patient agreeable to proceed with Levaquin      5  Prior enterococcus left TKR infection, 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     -antibiotics as above  -monitor temperature and hemodynamics     Antibiotics:  Levaquin D4     Above impression and plan discussed in detail with patient, sister and wife at bedside, RN, and primary care team      Subjective:  Patient has no fever, chills, sweats overnight; no nausea, vomiting, diarrhea; no cough, shortness of breath; + some epigastric intermittent discomfort  Appetite good  Appears to be tolerating antibiotic well  Playing cards      Objective:  Vitals:  Temp:  [97 3 °F (36 3 °C)-97 9 °F (36 6 °C)] 97 9 °F (36 6 °C)  HR:  [55] 55  Resp:  [18] 18  BP: (127-135)/(66-72) 127/66  SpO2:  [95 %] 95 %  Temp (24hrs), Av 7 °F (36 5 °C), Min:97 3 °F (36 3 °C), Max:97 9 °F (36 6 °C)  Current: Temperature: 97 9 °F (36 6 °C)    Physical Exam:   General Appearance:  Alert, interactive, nontoxic, no acute distress, sitting in chair  Throat: Oropharynx moist without lesions  Lungs:   Clear to auscultation bilaterally; no wheezes, rhonchi or rales; respirations unlabored   Heart:  RRR; + valve click   Abdomen:   Soft, no focal tenderness, protuberant, positive bowel sounds  Extremities: No clubbing, cyanosis or edema   : No jimenes, no SPT   Skin: No new rashes or lesions  IV site nontender  No draining wounds noted         Labs, Imaging, & Other studies:   All pertinent labs and imaging studies were personally reviewed  Results from last 7 days   Lab Units 09/10/21  0654 21  1030 21  0627   WBC Thousand/uL 7 24 10 14 6 42   HEMOGLOBIN g/dL 14 8 14 5 14 5 PLATELETS Thousands/uL 138* 122* 116*     Results from last 7 days   Lab Units 09/10/21  0654 09/09/21  0649 09/08/21  0627 09/07/21  0506 09/06/21  0609   SODIUM mmol/L 136 136 137 137 136   POTASSIUM mmol/L 4 0 4 2 4 2 4 1 4 5   CHLORIDE mmol/L 102 102 104 105 104   CO2 mmol/L 22 23 24 24 23   BUN mg/dL 20 18 16 12 11   CREATININE mg/dL 1 08 1 00 0 95 0 94 0 91   EGFR ml/min/1 73sq m 67 74 79 80 83   CALCIUM mg/dL 9 1 9 7 9 2 9 0 9 3   AST U/L  --  33  --  33 52*   ALT U/L  --  104*  --  141* 178*   ALK PHOS U/L  --  100  --  105 106     Results from last 7 days   Lab Units 09/06/21  1258 09/06/21  1257 09/03/21  1338 09/03/21  1149   BLOOD CULTURE  No Growth at 72 hrs  No Growth at 72 hrs  No Growth After 5 Days   Enterobacter aerogenes*   GRAM STAIN RESULT   --   --   --  Gram negative rods*     Results from last 7 days   Lab Units 09/04/21  0451 09/03/21  1659   PROCALCITONIN ng/ml 0 91* 1 39*

## 2021-09-10 NOTE — DISCHARGE SUMMARY
[address] [facility]  Discharge- Faizan [facility] 1945, 78 y o  male MRN: [id number]  Unit/Bed#: -Susan Encounter: [id number]  Primary Care Provider: Apple Jones   Date and time admitted to hospital: 9/3/2021 11:36 AM    Choledocholithiasis  Assessment & Plan  Noted on MRCP  Status post ERCP with stone removal  Surgery consulted plan for cholecystectomy inpatient versus outpatient  Follow-up recommendations      Gram-negative bacteremia  Assessment & Plan  Patient does have 1/2 blood cultures positive for Enterobacter aerogenous  Apparently this is sensitive to aztreonam     Likely from intra-abdominal source  MRCP performed yesterday showed choledocholithiasis  Status post stone removal  Currently on oral Levaquin to complete 9/15  Will have outpatient surgery performed once antibiotics completed which will occur [place]    DM (diabetes mellitus), type 2 [facility])  Assessment & Plan  No results found for: HGBA1C    Recent Labs     09/09/21  1137 09/09/21  1613 09/09/21  2111 09/10/21  0721   POCGLU 316* 191* 167* 222*       Blood Sugar Average: Last 72 hrs:  · (P) 552 [id number] hold oral antihyperglycemics    · Correctional scale insulin  · Glargine 28 units at night  · lispro 12 units with dinner  · NovoLog 70/3022 units in the morning      Elevated troponin  Assessment & Plan  · Mildly elevated troponin with flat curve is likely non MI troponin elevation  Patient denies any chest pain  Hypothyroidism  Assessment & Plan  Continue levothyroxine 50 mcg daily    Hypomagnesemia  Assessment & Plan  Repleted    Horseshoe kidney with renal calculus  Assessment & Plan  Plan is under CKD    History of endocarditis  Assessment & Plan  · 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       Given history of endocarditis and bioprosthetic aortic valve if persistent bacteremia consider echocardiogram      CKD (chronic kidney disease) stage 3, GFR Progress Note  Infectious Disease    Follow-up For:  Anterior abdominal wall cellulitis    SUBJECTIVE:   05/07/19: notes and consult of Dr. Juan reviewed. Photos reviewed. Day 4 of antibiotics. CRP has dropped by 50%. Seen by me 4/2017 for same illness. This is her 3rd or 4th episode in the last 2 years. She has weeping lesions lateral abdominal wall which could be the portals of entry. She feels near normal. She requested diuretics to reduce the swelling of her abdomen but I discouraged starving the rest of her body to reduce swelling in her abdominal wall.      Antibiotics (From admission, onward)    Start     Stop Route Frequency Ordered    05/05/19 0300  vancomycin (VANCOCIN) 1,500 mg in dextrose 5 % 250 mL IVPB      -- IV Every 12 hours (non-standard times) 05/04/19 1511    05/04/19 0600  clindamycin 600 MG/50 ML D5W 600 mg/50 mL IVPB 600 mg      -- IV Every 6 hours (non-standard times) 05/04/19 0100        Antifungals (From admission, onward)    Start     Stop Route Frequency Ordered    05/05/19 0923  fluconazole tablet 200 mg      -- Oral Daily 05/05/19 0923          EXAM & DIAGNOSTICS REVIEWED:   Vitals:     Temp:  [96.4 °F (35.8 °C)-97.9 °F (36.6 °C)]   Temp: 97.9 °F (36.6 °C) (05/07/19 1131)  Pulse: (!) 55 (05/07/19 1333)  Resp: 18 (05/07/19 1333)  BP: 107/69 (05/07/19 1131)  SpO2: 100 % (05/07/19 1333)    Intake/Output Summary (Last 24 hours) at 5/7/2019 1851  Last data filed at 5/7/2019 1700  Gross per 24 hour   Intake 1300 ml   Output 3200 ml   Net -1900 ml       General:  In NAD.   Eyes:  Anicteric,  Skin:  Candidiasis under abdominal folds  Wound: Several small wounds to lateral abdomen, weeping from small openings. Overall the abdominal wall lesions are much improved compared to 2017.  Erythema is improved, less tender.   Neuro: AAOx3, speech clear, moves all extrems equally         Lines/Tubes/Drains:    General Labs reviewed:  Recent Labs   Lab 05/07/19  0221   WBC 7.40   RBC 3.89*   HGB 11.2*    HCT 34.3*      MCV 88   MCH 28.8   MCHC 32.7     Recent Labs   Lab 05/07/19  0221   CALCIUM 9.0   *   K 3.6   CO2 26   CL 92*   BUN 12   CREATININE 0.7       Micro:  Microbiology Results (last 7 days)     Procedure Component Value Units Date/Time    Blood culture [[id number]] Collected:  05/04/19 1140    Order Status:  Completed Specimen:  Blood Updated:  05/07/19 1622     Blood Culture, Routine No Growth to date     Blood Culture, Routine No Growth to date     Blood Culture, Routine No Growth to date     Blood Culture, Routine No Growth to date    Blood culture [[id number]] Collected:  05/04/19 1140    Order Status:  Completed Specimen:  Blood Updated:  05/07/19 1622     Blood Culture, Routine No Growth to date     Blood Culture, Routine No Growth to date     Blood Culture, Routine No Growth to date     Blood Culture, Routine No Growth to date    Blood culture x two cultures. Draw prior to antibiotics. [[id number]] Collected:  05/03/19 2310    Order Status:  Completed Specimen:  Blood from Peripheral, Left  Hand Updated:  05/07/19 1212     Blood Culture, Routine No Growth to date     Blood Culture, Routine No Growth to date     Blood Culture, Routine No Growth to date     Blood Culture, Routine No Growth to date    Narrative:       Aerobic and anaerobic    Blood culture x two cultures. Draw prior to antibiotics. [[id number]] Collected:  05/03/19 2253    Order Status:  Completed Specimen:  Blood from Peripheral, Right  Hand Updated:  05/07/19 1212     Blood Culture, Routine No Growth to date     Blood Culture, Routine No Growth to date     Blood Culture, Routine No Growth to date     Blood Culture, Routine No Growth to date    Narrative:       Aerobic and anaerobic    Blood culture [[id number]]     Order Status:  Canceled Specimen:  Blood     Blood culture [[id number]]     Order Status:  Canceled Specimen:  Blood           Imaging Reviewed:  US: There is prominent subcutaneous edema and skin thickening  30-59 ml/min [facility])  Assessment & Plan  Lab Results   Component Value Date    EGFR 67 09/10/2021    EGFR 74 09/09/2021    EGFR 79 09/08/2021    CREATININE 1 08 09/10/2021    CREATININE 1 00 09/09/2021    CREATININE 0 95 09/08/2021     · Patient with history of horseshoe kidney  · Creatinine at baseline currently; baseline appears to be 1-1 2  · Continue to monitor    Benign prostatic hyperplasia without lower urinary tract symptoms  Assessment & Plan  Continue tamsulosin    CAD (coronary artery disease)  Assessment & Plan  · Stable at this time  · Continue propranolol 60 mg daily        * Sepsis ([address] )  Assessment & Plan  · Patient stated on admission that he woke up with fever and rigors the morning of admission and did state he had an episode of diarrhea today    Patient denies eating anything out of the ordinary yesterday and denies eating any shellfish or undercooked meats  · Secondary to likely intra-abdominal source Gram-negative bacteremia  · Plan as outlined above  · Continue oral Levaquin to complete therapy September 15th  · Sepsis has since resolved      Discharging Physician / Practitioner: Naif Flanagan MD  PCP: Apple Beyer  Admission Date:   Admission Orders (From admission, onward)     Ordered        09/03/21 1409  Inpatient Admission  Once                   Discharge Date: 09/10/21    Medical Problems     Resolved Problems  Date Reviewed: 9/10/2021    None                Consultations During Hospital Stay:  · General surgery, GI, Infectious Disease    Procedures Performed:   ERCP  Significant Findings / Test Results:   ERCP Fluoro    Result Date: 9/8/2021  Impression: Sphincterotomy and ampullary dilation with removal of multiple pigmented stones; unable to remove 1 large pigmented stone 7 [place] by 5 cm stent placement RECOMMENDATION: Laparoscopic cholecystectomy per surgery ERCP with stone removal and stent removal in 6-8 weeks Follow LFTs normal Continue antibiotics Eve Albarado III, MD    XR chest 1 view portable    Result Date: 9/3/2021  Impression: Status post cardiac surgery No acute cardiopulmonary disease  Workstation performed: [id number]     XR hip/pelv 2-3 vws left if performed    Result Date: 9/7/2021  Impression: No acute osseous abnormality  Workstation performed: [id number]     XR knee 1 or 2 vw left    Result Date: 9/7/2021  Impression: No acute osseous abnormality  Workstation performed: [id number]     CT chest abdomen pelvis w contrast    Result Date: 9/3/2021  Impression: 1  Mild bronchial wall thickening, which may related to small airwas inflammation  Right basilar subsegmental atelectasis also present  2   No acute abnormality within the abdomen or pelvis  3   Several enhancing foci within the posterior right hepatic lobe, possibly representing hemangiomas  Further characterization with nonemergent liver MRI recommended  4   Horseshoe kidney  The study was marked in [facility] for immediate notification  Workstation performed: [id number]     MRI abdomen w wo contrast and mrcp    Result Date: 9/8/2021  Impression: Choledocholithiasis with dilated common bile duct Mildly dilated persistent duct of [place] which opens at the minor papilla, 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 Hepatic steatosis Small enhancing lesions with persistent enhancement in the liver compatible with hemangioma Workstation performed: [id number]     FL ERCP biliary only    Result Date: 9/8/2021  Impression: Fluoroscopic guidance provided for procedure guidance  Please refer to the separate procedure notes for additional details  Workstation performed: [id number]     US right upper quadrant with liver dopplers    Result Date: 9/4/2021  Impression: Hepatomegaly with fatty infiltration  Normal evaluation of the hepatic vasculature  Cholelithiasis with no evidence of acute cholecystitis   Prominent common bile duct, however no obstructing lesions or intrahepatic observed in the area of clinical concern over the left lower quadrant abdominal wall compatible with the provided history of panniculitis/cellulitis.  No abscess appreciated.    ASSESSMENT & PLAN      Patient Active Problem List   Diagnosis    Diabetes mellitus with neuropathy    Long term current use of anticoagulant therapy    Major depression, recurrent, chronic    GERD (gastroesophageal reflux disease)    Tobacco dependency    Chronic atrial fibrillation    Morbid obesity with BMI of 40.0-44.9, adult    Hypertension associated with diabetes    PVD (peripheral vascular disease)    Abdominal aortic atherosclerosis    Dependency on pain medication    Iron deficiency anemia    DDD (degenerative disc disease), lumbar    Type 2 diabetes mellitus with diabetic neuropathy, without long-term current use of insulin    Hyperlipidemia associated with type 2 diabetes mellitus    NSAID long-term use    Mixed restrictive and obstructive lung disease    Hypercapnic respiratory failure, chronic    COPD (chronic obstructive pulmonary disease)    Cellulitis, abdominal wall    CHF (congestive heart failure)    Hepatomegaly    Chronic combined systolic and diastolic CHF (congestive heart failure)    Decubitus ulcer    Type 2 diabetes mellitus, without long-term current use of insulin    Retinal detachment of left eye with multiple breaks    Chronic pain syndrome    Wound, open, abdominal wall, lateral    Other nonthrombocytopenic purpura    Abdominal wall cellulitis    Encephalopathy, metabolic    Chronically on benzodiazepine therapy    Chronic prescription opiate use          cellulitis of LL abdominal wall, with associated small weeping ulcers   ----139--97              Procal 0.56              Fungal infection of groin-- skin folds    Morbid obesity    Recommendation:   Ok to go home tomorrow on clindamycin 300 mg QID for 7days, then please give rx for cefadroxil rx as follows: 500 mg once  a day as prevention, one by mouth twice daily prn flare up, #60 ref 3 and I will be glad to refill these in the future.     Counseled to elevate her abdomen on pillows to reduce swelling  Counseled to keep dry and clean coverings over the weeping spots  Counseled to separate the skin folds of her abdomen  from the surface of her thigh and groin to avoid trapping moisture and maintaining antifungal powder to reduce this as a portal of entry. Would support a week or so of diflucan at discharge as well.    Check ASO as this is consistent with recurrent Strep infections.     Continue outpatient wound care at [facility].         biliary dilatation identified  If clinical concern remains, consider MRCP for further evaluation  Workstation performed: [id number]   Incidental Findings:   · none     Test Results Pending at Discharge (will require follow up):   · none     Outpatient Tests Requested:  · none    Complications:  none    Reason for Admission:  Gram-negative bacteremia    Hospital Course:     Karlos Finney is a 78 y o  male patient who originally presented to the hospital on 9/3/2021 due to sepsis secondary to Gram-negative bacteremia found to be enterococci   Had CT abdomen and pelvis performed in MRCP showed choledocholithiasis  Had ERCP performed remove stone  Infection likely intra-abdominal   Was evaluated by surgery recommended have gallbladder removal once completed antibiotics will occurr [facility]      Please see above list of diagnoses and related plan for additional information  Condition at Discharge: stable     Discharge Day Visit / Exam:     Subjective:  No acute complaints  Vitals: Blood Pressure: 127/66 (09/10/21 0717)  Pulse: 55 (09/10/21 0717)  Temperature: 97 9 °F (36 6 °C) (09/10/21 0717)  Temp Source: Temporal (09/08/21 1135)  Respirations: 18 (09/09/21 2206)  Height: 6' 3" (190 5 cm) (09/03/21 1532)  Weight - Scale: 128 kg (281 lb 4 9 oz) (09/03/21 1146)  SpO2: 95 % (09/10/21 0717)  Exam:   Physical Exam  Constitutional:       General: He is not in acute distress  Appearance: He is well-developed  He is not diaphoretic  HENT:      Head: Normocephalic and atraumatic  Nose: Nose normal       Mouth/Throat:      Pharynx: No oropharyngeal exudate  Eyes:      General: No scleral icterus  Conjunctiva/sclera: Conjunctivae normal    Cardiovascular:      Rate and Rhythm: Normal rate and regular rhythm  Heart sounds: Normal heart sounds  No murmur heard  No friction rub  No gallop  Pulmonary:      Effort: Pulmonary effort is normal  No respiratory distress        Breath sounds: Normal breath sounds  No wheezing or rales  Chest:      Chest wall: No tenderness  Abdominal:      General: Bowel sounds are normal  There is no distension  Palpations: Abdomen is soft  Tenderness: There is no abdominal tenderness  There is no guarding  Musculoskeletal:         General: No tenderness or deformity  Normal range of motion  Cervical back: Normal range of motion and neck supple  Skin:     General: Skin is warm and dry  Findings: No erythema  Neurological:      Mental Status: He is alert  Mental status is at baseline  Discussion with Family: pt    Discharge instructions/Information to patient and family:   See after visit summary for information provided to patient and family  Provisions for Follow-Up Care:  See after visit summary for information related to follow-up care and any pertinent home health orders  Disposition:     Home    For Discharges to [facility]:   · Not Applicable to this Patient - Not Applicable to this Patient    Planned Readmission: none     Discharge Statement:  I spent 60 minutes discharging the patient  This time was spent on the day of discharge  I had direct contact with the patient on the day of discharge  Greater than 50% of the total time was spent examining patient, answering all patient questions, arranging and discussing plan of care with patient as well as directly providing post-discharge instructions  Additional time then spent on discharge activities  Discharge Medications:  See after visit summary for reconciled discharge medications provided to patient and family        ** Please Note: This note has been constructed using a voice recognition system **

## 2021-09-10 NOTE — ASSESSMENT & PLAN NOTE
Lab Results   Component Value Date    EGFR 67 09/10/2021    EGFR 74 09/09/2021    EGFR 79 09/08/2021    CREATININE 1 08 09/10/2021    CREATININE 1 00 09/09/2021    CREATININE 0 95 09/08/2021     · Patient with history of horseshoe kidney  · Creatinine at baseline currently; baseline appears to be 1-1 2  · Continue to monitor

## 2021-09-10 NOTE — ASSESSMENT & PLAN NOTE
· Patient stated on admission that he woke up with fever and rigors the morning of admission and did state he had an episode of diarrhea today    Patient denies eating anything out of the ordinary yesterday and denies eating any shellfish or undercooked meats  · Secondary to likely intra-abdominal source Gram-negative bacteremia  · Plan as outlined above  · Continue oral Levaquin to complete therapy September 15th  · Sepsis has since resolved

## 2021-09-12 LAB
BACTERIA BLD CULT: NORMAL
BACTERIA BLD CULT: NORMAL